# Patient Record
Sex: FEMALE | Race: BLACK OR AFRICAN AMERICAN | NOT HISPANIC OR LATINO | Employment: OTHER | URBAN - METROPOLITAN AREA
[De-identification: names, ages, dates, MRNs, and addresses within clinical notes are randomized per-mention and may not be internally consistent; named-entity substitution may affect disease eponyms.]

---

## 2017-11-13 ENCOUNTER — HISTORICAL (OUTPATIENT)
Dept: ADMINISTRATIVE | Facility: HOSPITAL | Age: 62
End: 2017-11-13

## 2017-11-13 LAB
ABS NEUT (OLG): 5.03 X10(3)/MCL (ref 2.1–9.2)
ALBUMIN SERPL-MCNC: 3.7 GM/DL (ref 3.4–5)
ALBUMIN/GLOB SERPL: 1.1 {RATIO}
ALP SERPL-CCNC: 76 UNIT/L (ref 38–126)
ALT SERPL-CCNC: 20 UNIT/L (ref 12–78)
AST SERPL-CCNC: 10 UNIT/L (ref 15–37)
BASOPHILS # BLD AUTO: 0 X10(3)/MCL (ref 0–0.2)
BASOPHILS NFR BLD AUTO: 1 %
BILIRUB SERPL-MCNC: 0.4 MG/DL (ref 0.2–1)
BILIRUBIN DIRECT+TOT PNL SERPL-MCNC: 0.1 MG/DL (ref 0–0.2)
BILIRUBIN DIRECT+TOT PNL SERPL-MCNC: 0.3 MG/DL (ref 0–0.8)
BUN SERPL-MCNC: 17 MG/DL (ref 7–18)
CALCIUM SERPL-MCNC: 9.1 MG/DL (ref 8.5–10.1)
CHLORIDE SERPL-SCNC: 100 MMOL/L (ref 98–107)
CHOLEST SERPL-MCNC: 257 MG/DL (ref 0–200)
CHOLEST/HDLC SERPL: 6.3 {RATIO} (ref 0–4)
CO2 SERPL-SCNC: 27 MMOL/L (ref 21–32)
CREAT SERPL-MCNC: 1.13 MG/DL (ref 0.55–1.02)
DEPRECATED CALCIDIOL+CALCIFEROL SERPL-MC: 23.65 NG/ML (ref 30–80)
EOSINOPHIL # BLD AUTO: 0.1 X10(3)/MCL (ref 0–0.9)
EOSINOPHIL NFR BLD AUTO: 1 %
ERYTHROCYTE [DISTWIDTH] IN BLOOD BY AUTOMATED COUNT: 12.7 % (ref 11.5–17)
EST. AVERAGE GLUCOSE BLD GHB EST-MCNC: 209 MG/DL
GLOBULIN SER-MCNC: 3.4 GM/DL (ref 2.4–3.5)
GLUCOSE SERPL-MCNC: 196 MG/DL (ref 74–106)
HBA1C MFR BLD: 8.9 % (ref 4.2–6.3)
HCT VFR BLD AUTO: 35.4 % (ref 37–47)
HDLC SERPL-MCNC: 41 MG/DL (ref 35–60)
HGB BLD-MCNC: 11.3 GM/DL (ref 12–16)
LDLC SERPL CALC-MCNC: 167 MG/DL (ref 0–129)
LYMPHOCYTES # BLD AUTO: 1.4 X10(3)/MCL (ref 0.6–4.6)
LYMPHOCYTES NFR BLD AUTO: 20 %
MCH RBC QN AUTO: 30.1 PG (ref 27–31)
MCHC RBC AUTO-ENTMCNC: 31.9 GM/DL (ref 33–36)
MCV RBC AUTO: 94.4 FL (ref 80–94)
MONOCYTES # BLD AUTO: 0.5 X10(3)/MCL (ref 0.1–1.3)
MONOCYTES NFR BLD AUTO: 7 %
NEUTROPHILS # BLD AUTO: 5.03 X10(3)/MCL (ref 1.4–7.9)
NEUTROPHILS NFR BLD AUTO: 70 %
PLATELET # BLD AUTO: 231 X10(3)/MCL (ref 130–400)
PMV BLD AUTO: 10.9 FL (ref 9.4–12.4)
POTASSIUM SERPL-SCNC: 3.8 MMOL/L (ref 3.5–5.1)
PROT SERPL-MCNC: 7.1 GM/DL (ref 6.4–8.2)
RBC # BLD AUTO: 3.75 X10(6)/MCL (ref 4.2–5.4)
SODIUM SERPL-SCNC: 136 MMOL/L (ref 136–145)
TRIGL SERPL-MCNC: 245 MG/DL (ref 30–150)
VLDLC SERPL CALC-MCNC: 49 MG/DL
WBC # SPEC AUTO: 7.2 X10(3)/MCL (ref 4.5–11.5)

## 2018-02-21 ENCOUNTER — HISTORICAL (OUTPATIENT)
Dept: RADIOLOGY | Facility: HOSPITAL | Age: 63
End: 2018-02-21

## 2018-02-26 ENCOUNTER — HISTORICAL (OUTPATIENT)
Dept: RADIOLOGY | Facility: HOSPITAL | Age: 63
End: 2018-02-26

## 2018-10-15 ENCOUNTER — HISTORICAL (OUTPATIENT)
Dept: RADIOLOGY | Facility: HOSPITAL | Age: 63
End: 2018-10-15

## 2018-11-08 ENCOUNTER — HISTORICAL (OUTPATIENT)
Dept: ADMINISTRATIVE | Facility: HOSPITAL | Age: 63
End: 2018-11-08

## 2018-11-08 LAB
ABS NEUT (OLG): 5.73 X10(3)/MCL (ref 2.1–9.2)
ALBUMIN SERPL-MCNC: 3.6 GM/DL (ref 3.4–5)
ALBUMIN/GLOB SERPL: 1 {RATIO}
ALP SERPL-CCNC: 70 UNIT/L (ref 38–126)
ALT SERPL-CCNC: 24 UNIT/L (ref 12–78)
APPEARANCE, UA: ABNORMAL
AST SERPL-CCNC: 9 UNIT/L (ref 15–37)
BACTERIA SPEC CULT: ABNORMAL /HPF
BASOPHILS # BLD AUTO: 0 X10(3)/MCL (ref 0–0.2)
BASOPHILS NFR BLD AUTO: 0 %
BILIRUB SERPL-MCNC: 0.7 MG/DL (ref 0.2–1)
BILIRUB UR QL STRIP: NEGATIVE
BILIRUBIN DIRECT+TOT PNL SERPL-MCNC: 0.1 MG/DL (ref 0–0.2)
BILIRUBIN DIRECT+TOT PNL SERPL-MCNC: 0.6 MG/DL (ref 0–0.8)
BUN SERPL-MCNC: 16 MG/DL (ref 7–18)
CALCIUM SERPL-MCNC: 9.7 MG/DL (ref 8.5–10.1)
CHLORIDE SERPL-SCNC: 98 MMOL/L (ref 98–107)
CHOLEST SERPL-MCNC: 253 MG/DL (ref 0–200)
CHOLEST/HDLC SERPL: 6.7 {RATIO} (ref 0–4)
CO2 SERPL-SCNC: 28 MMOL/L (ref 21–32)
COLOR UR: YELLOW
CREAT SERPL-MCNC: 1.21 MG/DL (ref 0.55–1.02)
CREAT UR-MCNC: 245 MG/DL
DEPRECATED CALCIDIOL+CALCIFEROL SERPL-MC: 27.85 NG/ML (ref 30–80)
EOSINOPHIL # BLD AUTO: 0.1 X10(3)/MCL (ref 0–0.9)
EOSINOPHIL NFR BLD AUTO: 1 %
ERYTHROCYTE [DISTWIDTH] IN BLOOD BY AUTOMATED COUNT: 12.4 % (ref 11.5–17)
EST. AVERAGE GLUCOSE BLD GHB EST-MCNC: 200 MG/DL
GLOBULIN SER-MCNC: 3.6 GM/DL (ref 2.4–3.5)
GLUCOSE (UA): NEGATIVE
GLUCOSE SERPL-MCNC: 207 MG/DL (ref 74–106)
HBA1C MFR BLD: 8.6 % (ref 4.2–6.3)
HCT VFR BLD AUTO: 36.8 % (ref 37–47)
HDLC SERPL-MCNC: 38 MG/DL (ref 35–60)
HGB BLD-MCNC: 11.9 GM/DL (ref 12–16)
HGB UR QL STRIP: NEGATIVE
KETONES UR QL STRIP: ABNORMAL
LDLC SERPL CALC-MCNC: 158 MG/DL (ref 0–129)
LEUKOCYTE ESTERASE UR QL STRIP: NEGATIVE
LYMPHOCYTES # BLD AUTO: 1.6 X10(3)/MCL (ref 0.6–4.6)
LYMPHOCYTES NFR BLD AUTO: 20 %
MCH RBC QN AUTO: 30.2 PG (ref 27–31)
MCHC RBC AUTO-ENTMCNC: 32.3 GM/DL (ref 33–36)
MCV RBC AUTO: 93.4 FL (ref 80–94)
MICROALBUMIN UR-MCNC: 2.9 MG/DL
MICROALBUMIN/CREAT RATIO PNL UR: 11.8 MG/GM CR (ref 0–30)
MONOCYTES # BLD AUTO: 0.5 X10(3)/MCL (ref 0.1–1.3)
MONOCYTES NFR BLD AUTO: 6 %
NEUTROPHILS # BLD AUTO: 5.73 X10(3)/MCL (ref 2.1–9.2)
NEUTROPHILS NFR BLD AUTO: 71 %
NITRITE UR QL STRIP: NEGATIVE
PH UR STRIP: 5 [PH] (ref 5–9)
PLATELET # BLD AUTO: 248 X10(3)/MCL (ref 130–400)
PMV BLD AUTO: 11 FL (ref 9.4–12.4)
POTASSIUM SERPL-SCNC: 3.9 MMOL/L (ref 3.5–5.1)
PROT SERPL-MCNC: 7.2 GM/DL (ref 6.4–8.2)
PROT UR QL STRIP: NEGATIVE
RBC # BLD AUTO: 3.94 X10(6)/MCL (ref 4.2–5.4)
RBC #/AREA URNS HPF: ABNORMAL /[HPF]
SODIUM SERPL-SCNC: 137 MMOL/L (ref 136–145)
SP GR UR STRIP: 1.02 (ref 1–1.03)
SQUAMOUS EPITHELIAL, UA: ABNORMAL
TRIGL SERPL-MCNC: 287 MG/DL (ref 30–150)
TSH SERPL-ACNC: 2.72 MIU/L (ref 0.36–3.74)
UROBILINOGEN UR STRIP-ACNC: 0.2
VLDLC SERPL CALC-MCNC: 57 MG/DL
WBC # SPEC AUTO: 8 X10(3)/MCL (ref 4.5–11.5)
WBC #/AREA URNS HPF: ABNORMAL /[HPF]

## 2019-01-15 ENCOUNTER — HISTORICAL (OUTPATIENT)
Dept: ADMINISTRATIVE | Facility: HOSPITAL | Age: 64
End: 2019-01-15

## 2019-01-22 ENCOUNTER — HISTORICAL (OUTPATIENT)
Dept: RADIOLOGY | Facility: HOSPITAL | Age: 64
End: 2019-01-22

## 2019-02-08 ENCOUNTER — HISTORICAL (OUTPATIENT)
Dept: ADMINISTRATIVE | Facility: HOSPITAL | Age: 64
End: 2019-02-08

## 2019-02-08 LAB
ALBUMIN SERPL-MCNC: 3.7 GM/DL (ref 3.4–5)
ALBUMIN/GLOB SERPL: 1.1 {RATIO}
ALP SERPL-CCNC: 70 UNIT/L (ref 38–126)
ALT SERPL-CCNC: 18 UNIT/L (ref 12–78)
AST SERPL-CCNC: 8 UNIT/L (ref 15–37)
BILIRUB SERPL-MCNC: 0.6 MG/DL (ref 0.2–1)
BILIRUBIN DIRECT+TOT PNL SERPL-MCNC: 0.1 MG/DL (ref 0–0.2)
BILIRUBIN DIRECT+TOT PNL SERPL-MCNC: 0.5 MG/DL (ref 0–0.8)
BUN SERPL-MCNC: 21 MG/DL (ref 7–18)
CALCIUM SERPL-MCNC: 9.4 MG/DL (ref 8.5–10.1)
CHLORIDE SERPL-SCNC: 100 MMOL/L (ref 98–107)
CHOLEST SERPL-MCNC: 219 MG/DL (ref 0–200)
CHOLEST/HDLC SERPL: 5.8 {RATIO} (ref 0–4)
CO2 SERPL-SCNC: 27 MMOL/L (ref 21–32)
CREAT SERPL-MCNC: 1.38 MG/DL (ref 0.55–1.02)
DEPRECATED CALCIDIOL+CALCIFEROL SERPL-MC: 48.64 NG/ML (ref 30–80)
EST. AVERAGE GLUCOSE BLD GHB EST-MCNC: 163 MG/DL
GLOBULIN SER-MCNC: 3.4 GM/DL (ref 2.4–3.5)
GLUCOSE SERPL-MCNC: 167 MG/DL (ref 74–106)
HBA1C MFR BLD: 7.3 % (ref 4.2–6.3)
HDLC SERPL-MCNC: 38 MG/DL (ref 35–60)
LDLC SERPL CALC-MCNC: 135 MG/DL (ref 0–129)
POTASSIUM SERPL-SCNC: 4 MMOL/L (ref 3.5–5.1)
PROT SERPL-MCNC: 7.1 GM/DL (ref 6.4–8.2)
SODIUM SERPL-SCNC: 137 MMOL/L (ref 136–145)
TRIGL SERPL-MCNC: 228 MG/DL (ref 30–150)
VLDLC SERPL CALC-MCNC: 46 MG/DL

## 2019-06-14 ENCOUNTER — HISTORICAL (OUTPATIENT)
Dept: ADMINISTRATIVE | Facility: HOSPITAL | Age: 64
End: 2019-06-14

## 2019-06-14 LAB
ALBUMIN SERPL-MCNC: 3.6 GM/DL (ref 3.4–5)
ALBUMIN/GLOB SERPL: 0.9 {RATIO}
ALP SERPL-CCNC: 76 UNIT/L (ref 38–126)
ALT SERPL-CCNC: 17 UNIT/L (ref 12–78)
AST SERPL-CCNC: 7 UNIT/L (ref 15–37)
BILIRUB SERPL-MCNC: 0.5 MG/DL (ref 0.2–1)
BILIRUBIN DIRECT+TOT PNL SERPL-MCNC: 0.1 MG/DL (ref 0–0.2)
BILIRUBIN DIRECT+TOT PNL SERPL-MCNC: 0.4 MG/DL (ref 0–0.8)
BUN SERPL-MCNC: 23 MG/DL (ref 7–18)
CALCIUM SERPL-MCNC: 10 MG/DL (ref 8.5–10.1)
CHLORIDE SERPL-SCNC: 100 MMOL/L (ref 98–107)
CHOLEST SERPL-MCNC: 232 MG/DL (ref 0–200)
CHOLEST/HDLC SERPL: 4.7 {RATIO} (ref 0–4)
CO2 SERPL-SCNC: 28 MMOL/L (ref 21–32)
CREAT SERPL-MCNC: 1.4 MG/DL (ref 0.55–1.02)
EST. AVERAGE GLUCOSE BLD GHB EST-MCNC: 143 MG/DL
GLOBULIN SER-MCNC: 3.9 GM/DL (ref 2.4–3.5)
GLUCOSE SERPL-MCNC: 151 MG/DL (ref 74–106)
HBA1C MFR BLD: 6.6 % (ref 4.2–6.3)
HDLC SERPL-MCNC: 49 MG/DL (ref 35–60)
LDLC SERPL CALC-MCNC: 148 MG/DL (ref 0–129)
POTASSIUM SERPL-SCNC: 3.9 MMOL/L (ref 3.5–5.1)
PROT SERPL-MCNC: 7.5 GM/DL (ref 6.4–8.2)
SODIUM SERPL-SCNC: 136 MMOL/L (ref 136–145)
TRIGL SERPL-MCNC: 174 MG/DL (ref 30–150)
VLDLC SERPL CALC-MCNC: 35 MG/DL

## 2019-07-03 ENCOUNTER — HISTORICAL (OUTPATIENT)
Dept: ADMINISTRATIVE | Facility: HOSPITAL | Age: 64
End: 2019-07-03

## 2019-07-03 LAB
ALBUMIN SERPL-MCNC: 3.5 GM/DL (ref 3.4–5)
ALBUMIN/GLOB SERPL: 1 {RATIO}
ALP SERPL-CCNC: 73 UNIT/L (ref 38–126)
ALT SERPL-CCNC: 18 UNIT/L (ref 12–78)
AST SERPL-CCNC: 8 UNIT/L (ref 15–37)
BILIRUB SERPL-MCNC: 0.6 MG/DL (ref 0.2–1)
BILIRUBIN DIRECT+TOT PNL SERPL-MCNC: 0.1 MG/DL (ref 0–0.2)
BILIRUBIN DIRECT+TOT PNL SERPL-MCNC: 0.5 MG/DL (ref 0–0.8)
BUN SERPL-MCNC: 17 MG/DL (ref 7–18)
CALCIUM SERPL-MCNC: 9.6 MG/DL (ref 8.5–10.1)
CHLORIDE SERPL-SCNC: 103 MMOL/L (ref 98–107)
CO2 SERPL-SCNC: 27 MMOL/L (ref 21–32)
CREAT SERPL-MCNC: 1.35 MG/DL (ref 0.55–1.02)
GLOBULIN SER-MCNC: 3.6 GM/DL (ref 2.4–3.5)
GLUCOSE SERPL-MCNC: 133 MG/DL (ref 74–106)
POTASSIUM SERPL-SCNC: 3.9 MMOL/L (ref 3.5–5.1)
PROT SERPL-MCNC: 7.1 GM/DL (ref 6.4–8.2)
SODIUM SERPL-SCNC: 138 MMOL/L (ref 136–145)

## 2019-07-10 ENCOUNTER — HISTORICAL (OUTPATIENT)
Dept: RADIOLOGY | Facility: HOSPITAL | Age: 64
End: 2019-07-10

## 2019-11-01 ENCOUNTER — HISTORICAL (OUTPATIENT)
Dept: ADMINISTRATIVE | Facility: HOSPITAL | Age: 64
End: 2019-11-01

## 2019-11-01 LAB
ABS NEUT (OLG): 4.67 X10(3)/MCL (ref 2.1–9.2)
ALBUMIN SERPL-MCNC: 3.7 GM/DL (ref 3.4–5)
ALBUMIN/GLOB SERPL: 1 RATIO (ref 1.1–2)
ALP SERPL-CCNC: 102 UNIT/L (ref 38–126)
ALT SERPL-CCNC: 20 UNIT/L (ref 12–78)
APPEARANCE, UA: ABNORMAL
AST SERPL-CCNC: 11 UNIT/L (ref 15–37)
BACTERIA SPEC CULT: ABNORMAL /HPF
BASOPHILS # BLD AUTO: 0.1 X10(3)/MCL (ref 0–0.2)
BASOPHILS NFR BLD AUTO: 1 %
BILIRUB SERPL-MCNC: 0.6 MG/DL (ref 0.2–1)
BILIRUB UR QL STRIP: NEGATIVE
BILIRUBIN DIRECT+TOT PNL SERPL-MCNC: 0.1 MG/DL (ref 0–0.5)
BILIRUBIN DIRECT+TOT PNL SERPL-MCNC: 0.5 MG/DL (ref 0–0.8)
BUN SERPL-MCNC: 34 MG/DL (ref 7–18)
CALCIUM SERPL-MCNC: 9.7 MG/DL (ref 8.5–10.1)
CHLORIDE SERPL-SCNC: 100 MMOL/L (ref 98–107)
CHOLEST SERPL-MCNC: 262 MG/DL (ref 0–200)
CHOLEST/HDLC SERPL: 6.2 {RATIO} (ref 0–4)
CO2 SERPL-SCNC: 29 MMOL/L (ref 21–32)
COLOR UR: YELLOW
CREAT SERPL-MCNC: 1.83 MG/DL (ref 0.55–1.02)
CREAT UR-MCNC: 245 MG/DL
EOSINOPHIL # BLD AUTO: 0.1 X10(3)/MCL (ref 0–0.9)
EOSINOPHIL NFR BLD AUTO: 2 %
ERYTHROCYTE [DISTWIDTH] IN BLOOD BY AUTOMATED COUNT: 12.3 % (ref 11.5–17)
EST. AVERAGE GLUCOSE BLD GHB EST-MCNC: 166 MG/DL
GLOBULIN SER-MCNC: 3.8 GM/DL (ref 2.4–3.5)
GLUCOSE (UA): NEGATIVE
GLUCOSE SERPL-MCNC: 214 MG/DL (ref 74–106)
HBA1C MFR BLD: 7.4 % (ref 4.2–6.3)
HCT VFR BLD AUTO: 35.9 % (ref 37–47)
HDLC SERPL-MCNC: 42 MG/DL (ref 35–60)
HGB BLD-MCNC: 11.5 GM/DL (ref 12–16)
HGB UR QL STRIP: NEGATIVE
KETONES UR QL STRIP: NEGATIVE
LDLC SERPL CALC-MCNC: 171 MG/DL (ref 0–129)
LEUKOCYTE ESTERASE UR QL STRIP: NEGATIVE
LYMPHOCYTES # BLD AUTO: 2 X10(3)/MCL (ref 0.6–4.6)
LYMPHOCYTES NFR BLD AUTO: 27 %
MCH RBC QN AUTO: 29.4 PG (ref 27–31)
MCHC RBC AUTO-ENTMCNC: 32 GM/DL (ref 33–36)
MCV RBC AUTO: 91.8 FL (ref 80–94)
MICROALBUMIN UR-MCNC: 1.5 MG/DL
MICROALBUMIN/CREAT RATIO PNL UR: 6.1 MG/GM CR (ref 0–30)
MONOCYTES # BLD AUTO: 0.4 X10(3)/MCL (ref 0.1–1.3)
MONOCYTES NFR BLD AUTO: 6 %
NEUTROPHILS # BLD AUTO: 4.67 X10(3)/MCL (ref 2.1–9.2)
NEUTROPHILS NFR BLD AUTO: 64 %
NITRITE UR QL STRIP: NEGATIVE
PH UR STRIP: 5 [PH] (ref 5–9)
PLATELET # BLD AUTO: 265 X10(3)/MCL (ref 130–400)
PMV BLD AUTO: 10.5 FL (ref 9.4–12.4)
POTASSIUM SERPL-SCNC: 4.1 MMOL/L (ref 3.5–5.1)
PROT SERPL-MCNC: 7.5 GM/DL (ref 6.4–8.2)
PROT UR QL STRIP: NEGATIVE
RBC # BLD AUTO: 3.91 X10(6)/MCL (ref 4.2–5.4)
RBC #/AREA URNS HPF: ABNORMAL /[HPF]
SODIUM SERPL-SCNC: 137 MMOL/L (ref 136–145)
SP GR UR STRIP: 1.02 (ref 1–1.03)
SQUAMOUS EPITHELIAL, UA: 16 /HPF (ref 0–4)
TRIGL SERPL-MCNC: 247 MG/DL (ref 30–150)
UROBILINOGEN UR STRIP-ACNC: 0.2
VLDLC SERPL CALC-MCNC: 49 MG/DL
WBC # SPEC AUTO: 7.3 X10(3)/MCL (ref 4.5–11.5)
WBC #/AREA URNS HPF: 5 /HPF (ref 0–3)

## 2019-12-03 ENCOUNTER — HISTORICAL (OUTPATIENT)
Dept: ADMINISTRATIVE | Facility: HOSPITAL | Age: 64
End: 2019-12-03

## 2019-12-03 LAB
ALBUMIN SERPL-MCNC: 3.4 GM/DL (ref 3.4–5)
ALBUMIN/GLOB SERPL: 0.9 RATIO (ref 1.1–2)
ALP SERPL-CCNC: 82 UNIT/L (ref 38–126)
ALT SERPL-CCNC: 21 UNIT/L (ref 12–78)
AST SERPL-CCNC: 9 UNIT/L (ref 15–37)
BILIRUB SERPL-MCNC: 0.5 MG/DL (ref 0.2–1)
BILIRUBIN DIRECT+TOT PNL SERPL-MCNC: 0.1 MG/DL (ref 0–0.5)
BILIRUBIN DIRECT+TOT PNL SERPL-MCNC: 0.4 MG/DL (ref 0–0.8)
BUN SERPL-MCNC: 17 MG/DL (ref 7–18)
CALCIUM SERPL-MCNC: 9.5 MG/DL (ref 8.5–10.1)
CHLORIDE SERPL-SCNC: 105 MMOL/L (ref 98–107)
CO2 SERPL-SCNC: 27 MMOL/L (ref 21–32)
CREAT SERPL-MCNC: 1.28 MG/DL (ref 0.55–1.02)
GLOBULIN SER-MCNC: 3.8 GM/DL (ref 2.4–3.5)
GLUCOSE SERPL-MCNC: 142 MG/DL (ref 74–106)
POTASSIUM SERPL-SCNC: 4 MMOL/L (ref 3.5–5.1)
PROT SERPL-MCNC: 7.2 GM/DL (ref 6.4–8.2)
SODIUM SERPL-SCNC: 139 MMOL/L (ref 136–145)

## 2020-07-29 ENCOUNTER — HISTORICAL (OUTPATIENT)
Dept: RADIOLOGY | Facility: HOSPITAL | Age: 65
End: 2020-07-29

## 2020-08-04 ENCOUNTER — HISTORICAL (OUTPATIENT)
Dept: ADMINISTRATIVE | Facility: HOSPITAL | Age: 65
End: 2020-08-04

## 2020-08-04 LAB
ABS NEUT (OLG): 4.77 X10(3)/MCL (ref 2.1–9.2)
ALBUMIN SERPL-MCNC: 3.9 GM/DL (ref 3.4–5)
ALBUMIN/GLOB SERPL: 1.1 RATIO (ref 1.1–2)
ALP SERPL-CCNC: 88 UNIT/L (ref 40–150)
ALT SERPL-CCNC: 13 UNIT/L (ref 0–55)
APPEARANCE, UA: CLEAR
AST SERPL-CCNC: 9 UNIT/L (ref 5–34)
BACTERIA SPEC CULT: ABNORMAL /HPF
BASOPHILS # BLD AUTO: 0 X10(3)/MCL (ref 0–0.2)
BASOPHILS NFR BLD AUTO: 1 %
BILIRUB SERPL-MCNC: 0.5 MG/DL
BILIRUB UR QL STRIP: NEGATIVE
BILIRUBIN DIRECT+TOT PNL SERPL-MCNC: 0.2 MG/DL (ref 0–0.5)
BILIRUBIN DIRECT+TOT PNL SERPL-MCNC: 0.3 MG/DL (ref 0–0.8)
BUN SERPL-MCNC: 27.6 MG/DL (ref 9.8–20.1)
CALCIUM SERPL-MCNC: 9.5 MG/DL (ref 8.4–10.2)
CHLORIDE SERPL-SCNC: 106 MMOL/L (ref 98–107)
CHOLEST SERPL-MCNC: 256 MG/DL
CHOLEST/HDLC SERPL: 7 {RATIO} (ref 0–5)
CO2 SERPL-SCNC: 28 MMOL/L (ref 23–31)
COLOR UR: YELLOW
CREAT SERPL-MCNC: 1.43 MG/DL (ref 0.55–1.02)
CREAT UR-MCNC: 142.8 MG/DL (ref 45–106)
EOSINOPHIL # BLD AUTO: 0.1 X10(3)/MCL (ref 0–0.9)
EOSINOPHIL NFR BLD AUTO: 2 %
ERYTHROCYTE [DISTWIDTH] IN BLOOD BY AUTOMATED COUNT: 13.1 % (ref 11.5–17)
EST. AVERAGE GLUCOSE BLD GHB EST-MCNC: 128.4 MG/DL
GLOBULIN SER-MCNC: 3.5 GM/DL (ref 2.4–3.5)
GLUCOSE (UA): NEGATIVE
GLUCOSE SERPL-MCNC: 169 MG/DL (ref 82–115)
HBA1C MFR BLD: 6.1 %
HCT VFR BLD AUTO: 35.6 % (ref 37–47)
HDLC SERPL-MCNC: 39 MG/DL (ref 35–60)
HGB BLD-MCNC: 11.4 GM/DL (ref 12–16)
HGB UR QL STRIP: NEGATIVE
KETONES UR QL STRIP: NEGATIVE
LDLC SERPL CALC-MCNC: 182 MG/DL (ref 50–140)
LEUKOCYTE ESTERASE UR QL STRIP: ABNORMAL
LYMPHOCYTES # BLD AUTO: 1.3 X10(3)/MCL (ref 0.6–4.6)
LYMPHOCYTES NFR BLD AUTO: 19 %
MCH RBC QN AUTO: 30.5 PG (ref 27–31)
MCHC RBC AUTO-ENTMCNC: 32 GM/DL (ref 33–36)
MCV RBC AUTO: 95.2 FL (ref 80–94)
MICROALBUMIN UR-MCNC: 13.4 UG/ML
MICROALBUMIN/CREAT RATIO PNL UR: 9.4 MG/GM CR (ref 0–30)
MONOCYTES # BLD AUTO: 0.4 X10(3)/MCL (ref 0.1–1.3)
MONOCYTES NFR BLD AUTO: 6 %
NEUTROPHILS # BLD AUTO: 4.77 X10(3)/MCL (ref 2.1–9.2)
NEUTROPHILS NFR BLD AUTO: 72 %
NITRITE UR QL STRIP: NEGATIVE
PH UR STRIP: 5 [PH] (ref 5–9)
PLATELET # BLD AUTO: 237 X10(3)/MCL (ref 130–400)
PMV BLD AUTO: 10.6 FL (ref 9.4–12.4)
POTASSIUM SERPL-SCNC: 4.4 MMOL/L (ref 3.5–5.1)
PROT SERPL-MCNC: 7.4 GM/DL (ref 5.8–7.6)
PROT UR QL STRIP: NEGATIVE
RBC # BLD AUTO: 3.74 X10(6)/MCL (ref 4.2–5.4)
RBC #/AREA URNS HPF: ABNORMAL /[HPF]
SODIUM SERPL-SCNC: 139 MMOL/L (ref 136–145)
SP GR UR STRIP: 1.02 (ref 1–1.03)
SQUAMOUS EPITHELIAL, UA: ABNORMAL
TRIGL SERPL-MCNC: 173 MG/DL (ref 37–140)
TSH SERPL-ACNC: 1.83 UIU/ML (ref 0.35–4.94)
UROBILINOGEN UR STRIP-ACNC: 0.2
VLDLC SERPL CALC-MCNC: 35 MG/DL
WBC # SPEC AUTO: 6.6 X10(3)/MCL (ref 4.5–11.5)
WBC #/AREA URNS HPF: 8 /HPF (ref 0–3)

## 2021-02-05 ENCOUNTER — HISTORICAL (OUTPATIENT)
Dept: ADMINISTRATIVE | Facility: HOSPITAL | Age: 66
End: 2021-02-05

## 2021-02-05 LAB
APPEARANCE, UA: CLEAR
BACTERIA SPEC CULT: ABNORMAL /HPF
BILIRUB UR QL STRIP: NEGATIVE
COLOR UR: YELLOW
GLUCOSE (UA): NEGATIVE
HGB UR QL STRIP: NEGATIVE
KETONES UR QL STRIP: NEGATIVE
LEUKOCYTE ESTERASE UR QL STRIP: NEGATIVE
NITRITE UR QL STRIP: NEGATIVE
PH UR STRIP: 5 [PH] (ref 5–9)
PROT UR QL STRIP: ABNORMAL
RBC #/AREA URNS HPF: ABNORMAL /[HPF]
SP GR UR STRIP: 1.02 (ref 1–1.03)
SQUAMOUS EPITHELIAL, UA: ABNORMAL
UROBILINOGEN UR STRIP-ACNC: 0.2
WBC #/AREA URNS HPF: ABNORMAL /[HPF]

## 2021-07-08 ENCOUNTER — HISTORICAL (OUTPATIENT)
Dept: ADMINISTRATIVE | Facility: HOSPITAL | Age: 66
End: 2021-07-08

## 2021-07-08 LAB
ABS NEUT (OLG): 5.87 X10(3)/MCL (ref 2.1–9.2)
ALBUMIN SERPL-MCNC: 3.8 GM/DL (ref 3.4–4.8)
ALBUMIN/GLOB SERPL: 1 RATIO (ref 1.1–2)
ALP SERPL-CCNC: 79 UNIT/L (ref 40–150)
ALT SERPL-CCNC: 16 UNIT/L (ref 0–55)
APPEARANCE, UA: CLEAR
AST SERPL-CCNC: 13 UNIT/L (ref 5–34)
BACTERIA SPEC CULT: ABNORMAL /HPF
BASOPHILS # BLD AUTO: 0 X10(3)/MCL (ref 0–0.2)
BASOPHILS NFR BLD AUTO: 0 %
BILIRUB SERPL-MCNC: 0.6 MG/DL
BILIRUB UR QL STRIP: NEGATIVE
BILIRUBIN DIRECT+TOT PNL SERPL-MCNC: 0.2 MG/DL (ref 0–0.5)
BILIRUBIN DIRECT+TOT PNL SERPL-MCNC: 0.4 MG/DL (ref 0–0.8)
BUN SERPL-MCNC: 31.3 MG/DL (ref 9.8–20.1)
CALCIUM SERPL-MCNC: 10.3 MG/DL (ref 8.4–10.2)
CHLORIDE SERPL-SCNC: 101 MMOL/L (ref 98–107)
CHOLEST SERPL-MCNC: 260 MG/DL
CHOLEST/HDLC SERPL: 6 {RATIO} (ref 0–5)
CO2 SERPL-SCNC: 26 MMOL/L (ref 23–31)
COLOR UR: YELLOW
CREAT SERPL-MCNC: 1.63 MG/DL (ref 0.55–1.02)
CREAT UR-MCNC: 189.3 MG/DL (ref 45–106)
EOSINOPHIL # BLD AUTO: 0.1 X10(3)/MCL (ref 0–0.9)
EOSINOPHIL NFR BLD AUTO: 1 %
ERYTHROCYTE [DISTWIDTH] IN BLOOD BY AUTOMATED COUNT: 13.2 % (ref 11.5–17)
EST. AVERAGE GLUCOSE BLD GHB EST-MCNC: 157.1 MG/DL
GLOBULIN SER-MCNC: 3.9 GM/DL (ref 2.4–3.5)
GLUCOSE (UA): NEGATIVE
GLUCOSE SERPL-MCNC: 180 MG/DL (ref 82–115)
HBA1C MFR BLD: 7.1 %
HCT VFR BLD AUTO: 34.5 % (ref 37–47)
HDLC SERPL-MCNC: 43 MG/DL (ref 35–60)
HGB BLD-MCNC: 11.4 GM/DL (ref 12–16)
HGB UR QL STRIP: NEGATIVE
KETONES UR QL STRIP: ABNORMAL
LDLC SERPL CALC-MCNC: 189 MG/DL (ref 50–140)
LEUKOCYTE ESTERASE UR QL STRIP: NEGATIVE
LYMPHOCYTES # BLD AUTO: 1.2 X10(3)/MCL (ref 0.6–4.6)
LYMPHOCYTES NFR BLD AUTO: 15 %
MCH RBC QN AUTO: 30.6 PG (ref 27–31)
MCHC RBC AUTO-ENTMCNC: 33 GM/DL (ref 33–36)
MCV RBC AUTO: 92.7 FL (ref 80–94)
MICROALBUMIN UR-MCNC: 16.1 UG/ML
MICROALBUMIN/CREAT RATIO PNL UR: 8.5 MG/GM CR (ref 0–30)
MONOCYTES # BLD AUTO: 0.4 X10(3)/MCL (ref 0.1–1.3)
MONOCYTES NFR BLD AUTO: 6 %
NEUTROPHILS # BLD AUTO: 5.87 X10(3)/MCL (ref 2.1–9.2)
NEUTROPHILS NFR BLD AUTO: 78 %
NITRITE UR QL STRIP: NEGATIVE
PH UR STRIP: 5.5 [PH] (ref 5–9)
PLATELET # BLD AUTO: 251 X10(3)/MCL (ref 130–400)
PMV BLD AUTO: 11.3 FL (ref 9.4–12.4)
POTASSIUM SERPL-SCNC: 4.4 MMOL/L (ref 3.5–5.1)
PROT SERPL-MCNC: 7.7 GM/DL (ref 5.8–7.6)
PROT UR QL STRIP: NEGATIVE
RBC # BLD AUTO: 3.72 X10(6)/MCL (ref 4.2–5.4)
RBC #/AREA URNS HPF: ABNORMAL /[HPF]
SODIUM SERPL-SCNC: 136 MMOL/L (ref 136–145)
SP GR UR STRIP: 1.02 (ref 1–1.03)
SQUAMOUS EPITHELIAL, UA: ABNORMAL /HPF
TRIGL SERPL-MCNC: 139 MG/DL (ref 37–140)
TSH SERPL-ACNC: 1.82 UIU/ML (ref 0.35–4.94)
UA WBC MAN: ABNORMAL
UROBILINOGEN UR STRIP-ACNC: 0.2
VLDLC SERPL CALC-MCNC: 28 MG/DL
WBC # SPEC AUTO: 7.6 X10(3)/MCL (ref 4.5–11.5)

## 2021-07-29 ENCOUNTER — HISTORICAL (OUTPATIENT)
Dept: ADMINISTRATIVE | Facility: HOSPITAL | Age: 66
End: 2021-07-29

## 2021-07-29 LAB
ALBUMIN SERPL-MCNC: 3.5 GM/DL (ref 3.4–4.8)
ALBUMIN/GLOB SERPL: 0.9 RATIO (ref 1.1–2)
ALP SERPL-CCNC: 73 UNIT/L (ref 40–150)
ALT SERPL-CCNC: 15 UNIT/L (ref 0–55)
AST SERPL-CCNC: 11 UNIT/L (ref 5–34)
BILIRUB SERPL-MCNC: 0.5 MG/DL
BILIRUBIN DIRECT+TOT PNL SERPL-MCNC: 0.2 MG/DL (ref 0–0.5)
BILIRUBIN DIRECT+TOT PNL SERPL-MCNC: 0.3 MG/DL (ref 0–0.8)
BUN SERPL-MCNC: 24.9 MG/DL (ref 9.8–20.1)
CALCIUM SERPL-MCNC: 10 MG/DL (ref 8.4–10.2)
CHLORIDE SERPL-SCNC: 98 MMOL/L (ref 98–107)
CO2 SERPL-SCNC: 27 MMOL/L (ref 23–31)
CREAT SERPL-MCNC: 1.51 MG/DL (ref 0.55–1.02)
GLOBULIN SER-MCNC: 3.8 GM/DL (ref 2.4–3.5)
GLUCOSE SERPL-MCNC: 247 MG/DL (ref 82–115)
POTASSIUM SERPL-SCNC: 4.2 MMOL/L (ref 3.5–5.1)
PROT SERPL-MCNC: 7.3 GM/DL (ref 5.8–7.6)
SODIUM SERPL-SCNC: 135 MMOL/L (ref 136–145)

## 2021-08-02 ENCOUNTER — HISTORICAL (OUTPATIENT)
Dept: RADIOLOGY | Facility: HOSPITAL | Age: 66
End: 2021-08-02

## 2021-10-01 ENCOUNTER — HISTORICAL (OUTPATIENT)
Dept: ADMINISTRATIVE | Facility: HOSPITAL | Age: 66
End: 2021-10-01

## 2021-10-01 LAB
CHOLEST SERPL-MCNC: 241 MG/DL
CHOLEST/HDLC SERPL: 6 {RATIO} (ref 0–5)
EST. AVERAGE GLUCOSE BLD GHB EST-MCNC: 157.1 MG/DL
HBA1C MFR BLD: 7.1 %
HDLC SERPL-MCNC: 40 MG/DL (ref 35–60)
LDLC SERPL CALC-MCNC: 170 MG/DL (ref 50–140)
TRIGL SERPL-MCNC: 153 MG/DL (ref 37–140)
VLDLC SERPL CALC-MCNC: 31 MG/DL

## 2021-10-08 ENCOUNTER — HISTORICAL (OUTPATIENT)
Dept: ADMINISTRATIVE | Facility: HOSPITAL | Age: 66
End: 2021-10-08

## 2021-10-08 LAB
ABS NEUT (OLG): 4.7 X10(3)/MCL (ref 2.1–9.2)
ALBUMIN SERPL-MCNC: 3.8 GM/DL (ref 3.4–4.8)
ALBUMIN/GLOB SERPL: 1.1 RATIO (ref 1.1–2)
ALP SERPL-CCNC: 67 UNIT/L (ref 40–150)
ALT SERPL-CCNC: 14 UNIT/L (ref 0–55)
ANTINUCLEAR ANTIBODY SCREEN (OHS): NEGATIVE
AST SERPL-CCNC: 13 UNIT/L (ref 5–34)
BASOPHILS # BLD AUTO: 0 X10(3)/MCL (ref 0–0.2)
BASOPHILS NFR BLD AUTO: 0 %
BILIRUB SERPL-MCNC: 0.6 MG/DL
BILIRUBIN DIRECT+TOT PNL SERPL-MCNC: 0.2 MG/DL (ref 0–0.5)
BILIRUBIN DIRECT+TOT PNL SERPL-MCNC: 0.4 MG/DL (ref 0–0.8)
BUN SERPL-MCNC: 22.9 MG/DL (ref 9.8–20.1)
C3 SERPL-MCNC: 124 MG/DL (ref 80–173)
C4 SERPL-MCNC: 29.8 MG/DL (ref 13–46)
CALCIUM SERPL-MCNC: 9.7 MG/DL (ref 8.4–10.2)
CHLORIDE SERPL-SCNC: 105 MMOL/L (ref 98–107)
CO2 SERPL-SCNC: 25 MMOL/L (ref 23–31)
CREAT SERPL-MCNC: 1.5 MG/DL (ref 0.55–1.02)
DEPRECATED CALCIDIOL+CALCIFEROL SERPL-MC: 52 NG/ML (ref 30–80)
DSDNA ANTIBODY (OHS): POSITIVE
EOSINOPHIL # BLD AUTO: 0.1 X10(3)/MCL (ref 0–0.9)
EOSINOPHIL NFR BLD AUTO: 1 %
ERYTHROCYTE [DISTWIDTH] IN BLOOD BY AUTOMATED COUNT: 13.1 % (ref 11.5–17)
GLOBULIN SER-MCNC: 3.4 GM/DL (ref 2.4–3.5)
GLUCOSE SERPL-MCNC: 224 MG/DL (ref 82–115)
HCT VFR BLD AUTO: 34 % (ref 37–47)
HGB BLD-MCNC: 10.7 GM/DL (ref 12–16)
LYMPHOCYTES # BLD AUTO: 1 X10(3)/MCL (ref 0.6–4.6)
LYMPHOCYTES NFR BLD AUTO: 17 %
MAGNESIUM SERPL-MCNC: 1.9 MG/DL (ref 1.6–2.6)
MCH RBC QN AUTO: 29.6 PG (ref 27–31)
MCHC RBC AUTO-ENTMCNC: 31.5 GM/DL (ref 33–36)
MCV RBC AUTO: 93.9 FL (ref 80–94)
MONOCYTES # BLD AUTO: 0.3 X10(3)/MCL (ref 0.1–1.3)
MONOCYTES NFR BLD AUTO: 5 %
NEUTROPHILS # BLD AUTO: 4.7 X10(3)/MCL (ref 2.1–9.2)
NEUTROPHILS NFR BLD AUTO: 76 %
PHOSPHATE SERPL-MCNC: 3.2 MG/DL (ref 2.3–4.7)
PLATELET # BLD AUTO: 250 X10(3)/MCL (ref 130–400)
PMV BLD AUTO: 11.4 FL (ref 9.4–12.4)
POTASSIUM SERPL-SCNC: 4.2 MMOL/L (ref 3.5–5.1)
PROT 24H UR-MCNC: <136 MG/24 H (ref 0–165)
PROT SERPL-MCNC: 7.2 GM/DL (ref 5.8–7.6)
PTH-INTACT SERPL-MCNC: 60.4 PG/ML (ref 8.7–77.1)
RBC # BLD AUTO: 3.62 X10(6)/MCL (ref 4.2–5.4)
SODIUM SERPL-SCNC: 138 MMOL/L (ref 136–145)
WBC # SPEC AUTO: 6.2 X10(3)/MCL (ref 4.5–11.5)

## 2021-10-11 ENCOUNTER — HISTORICAL (OUTPATIENT)
Dept: RADIOLOGY | Facility: HOSPITAL | Age: 66
End: 2021-10-11

## 2021-11-09 LAB
LEFT EYE DM RETINOPATHY: NEGATIVE
RIGHT EYE DM RETINOPATHY: NEGATIVE

## 2022-03-08 ENCOUNTER — HISTORICAL (OUTPATIENT)
Dept: ADMINISTRATIVE | Facility: HOSPITAL | Age: 67
End: 2022-03-08

## 2022-03-08 LAB
ALBUMIN SERPL-MCNC: 3.8 G/DL (ref 3.4–4.8)
ALBUMIN/GLOB SERPL: 0.9 {RATIO} (ref 1.1–2)
ALP SERPL-CCNC: 62 U/L (ref 40–150)
ALT SERPL-CCNC: 19 U/L (ref 0–55)
AST SERPL-CCNC: 14 U/L (ref 5–34)
BILIRUB SERPL-MCNC: 0.4 MG/DL
BILIRUBIN DIRECT+TOT PNL SERPL-MCNC: 0.2 (ref 0–0.5)
BILIRUBIN DIRECT+TOT PNL SERPL-MCNC: 0.2 (ref 0–0.8)
BUN SERPL-MCNC: 23.5 MG/DL (ref 9.8–20.1)
CALCIUM SERPL-MCNC: 9.8 MG/DL (ref 8.7–10.5)
CHLORIDE SERPL-SCNC: 101 MMOL/L (ref 98–107)
CHOLEST SERPL-MCNC: 248 MG/DL
CHOLEST/HDLC SERPL: 6 {RATIO} (ref 0–5)
CO2 SERPL-SCNC: 26 MMOL/L (ref 23–31)
CREAT SERPL-MCNC: 1.32 MG/DL (ref 0.55–1.02)
EST. AVERAGE GLUCOSE BLD GHB EST-MCNC: 191.5 MG/DL
GLOBULIN SER-MCNC: 4.2 G/DL (ref 2.4–3.5)
GLUCOSE SERPL-MCNC: 188 MG/DL (ref 82–115)
HBA1C MFR BLD: 8.3 %
HDLC SERPL-MCNC: 42 MG/DL (ref 35–60)
HEMOLYSIS INTERF INDEX SERPL-ACNC: 2
ICTERIC INTERF INDEX SERPL-ACNC: 1
LDLC SERPL CALC-MCNC: 163 MG/DL (ref 50–140)
LIPEMIC INTERF INDEX SERPL-ACNC: 3
POTASSIUM SERPL-SCNC: 4.6 MMOL/L (ref 3.5–5.1)
PROT SERPL-MCNC: 8 G/DL (ref 5.8–7.6)
SODIUM SERPL-SCNC: 138 MMOL/L (ref 136–145)
TRIGL SERPL-MCNC: 214 MG/DL (ref 37–140)
VLDLC SERPL CALC-MCNC: 43 MG/DL

## 2022-04-10 ENCOUNTER — HISTORICAL (OUTPATIENT)
Dept: ADMINISTRATIVE | Facility: HOSPITAL | Age: 67
End: 2022-04-10

## 2022-04-22 ENCOUNTER — HISTORICAL (OUTPATIENT)
Dept: ADMINISTRATIVE | Facility: HOSPITAL | Age: 67
End: 2022-04-22

## 2022-04-22 LAB
ABS NEUT (OLG): 4.11 (ref 2.1–9.2)
ALBUMIN SERPL-MCNC: 3.7 G/DL (ref 3.4–4.8)
ALBUMIN/GLOB SERPL: 1.1 {RATIO} (ref 1.1–2)
ALP SERPL-CCNC: 61 U/L (ref 40–150)
ALT SERPL-CCNC: 19 U/L (ref 0–55)
AST SERPL-CCNC: 15 U/L (ref 5–34)
BASOPHILS # BLD AUTO: 0 10*3/UL (ref 0–0.2)
BASOPHILS NFR BLD AUTO: 0 %
BILIRUB SERPL-MCNC: 0.4 MG/DL
BILIRUBIN DIRECT+TOT PNL SERPL-MCNC: 0.2 (ref 0–0.5)
BILIRUBIN DIRECT+TOT PNL SERPL-MCNC: 0.2 (ref 0–0.8)
BUN SERPL-MCNC: 21.2 MG/DL (ref 9.8–20.1)
CALCIUM SERPL-MCNC: 9.9 MG/DL (ref 8.7–10.5)
CHLORIDE SERPL-SCNC: 100 MMOL/L (ref 98–107)
CO2 SERPL-SCNC: 27 MMOL/L (ref 23–31)
CREAT SERPL-MCNC: 1.43 MG/DL (ref 0.55–1.02)
EOSINOPHIL # BLD AUTO: 0.1 10*3/UL (ref 0–0.9)
EOSINOPHIL NFR BLD AUTO: 1 %
ERYTHROCYTE [DISTWIDTH] IN BLOOD BY AUTOMATED COUNT: 12.3 % (ref 11.5–17)
GLOBULIN SER-MCNC: 3.4 G/DL (ref 2.4–3.5)
GLUCOSE SERPL-MCNC: 233 MG/DL (ref 82–115)
HCT VFR BLD AUTO: 33.8 % (ref 37–47)
HEMOLYSIS INTERF INDEX SERPL-ACNC: 7
HGB BLD-MCNC: 11.2 G/DL (ref 12–16)
ICTERIC INTERF INDEX SERPL-ACNC: 0
IMM GRANULOCYTES # BLD AUTO: 0.02 10*3/UL
IMM GRANULOCYTES NFR BLD AUTO: 0 %
LIPEMIC INTERF INDEX SERPL-ACNC: <0
LYMPHOCYTES # BLD AUTO: 0.8 10*3/UL (ref 0.6–4.6)
LYMPHOCYTES NFR BLD AUTO: 14 %
MANUAL DIFF? (OHS): NO
MCH RBC QN AUTO: 31 PG (ref 27–31)
MCHC RBC AUTO-ENTMCNC: 33.1 G/DL (ref 33–36)
MCV RBC AUTO: 93.6 FL (ref 80–94)
MONOCYTES # BLD AUTO: 0.5 10*3/UL (ref 0.1–1.3)
MONOCYTES NFR BLD AUTO: 10 %
NEUTROPHILS # BLD AUTO: 4.11 10*3/UL (ref 2.1–9.2)
NEUTROPHILS NFR BLD AUTO: 74 %
PLATELET # BLD AUTO: 237 10*3/UL (ref 130–400)
PMV BLD AUTO: 10.8 FL (ref 9.4–12.4)
POTASSIUM SERPL-SCNC: 4 MMOL/L (ref 3.5–5.1)
PROT SERPL-MCNC: 7.1 G/DL (ref 5.8–7.6)
RBC # BLD AUTO: 3.61 10*6/UL (ref 4.2–5.4)
SODIUM SERPL-SCNC: 136 MMOL/L (ref 136–145)
WBC # SPEC AUTO: 5.6 10*3/UL (ref 4.5–11.5)

## 2022-04-26 VITALS
HEIGHT: 66 IN | SYSTOLIC BLOOD PRESSURE: 136 MMHG | BODY MASS INDEX: 36.49 KG/M2 | WEIGHT: 227.06 LBS | DIASTOLIC BLOOD PRESSURE: 75 MMHG | OXYGEN SATURATION: 99 %

## 2022-07-14 ENCOUNTER — TELEPHONE (OUTPATIENT)
Dept: FAMILY MEDICINE | Facility: CLINIC | Age: 67
End: 2022-07-14
Payer: MEDICARE

## 2022-07-14 NOTE — TELEPHONE ENCOUNTER
----- Message from Luis E Kiser sent at 7/14/2022  9:32 AM CDT -----  Regarding: RX Request  Type:  Patient Returning Call    Who Called: pt  Who Left Message for Patient: Nurse  Does the patient know what this is regarding?: yes  Would the patient rather a call back or a response via [facility]?  na  Best Call Back Number:alycia  Additional Information:  pt was advised by pharmacy to get status update on the fax that was sent for a medication refill

## 2022-07-14 NOTE — TELEPHONE ENCOUNTER
Patient requesting refill of biest topical cream 1.75 mg/ml    I am not sure why, but I am unable to propose this medication in the message. Please advise on refill request. Patient asked to send refill to [facility] pharmacy

## 2022-07-20 ENCOUNTER — LAB VISIT (OUTPATIENT)
Dept: LAB | Facility: HOSPITAL | Age: 67
End: 2022-07-20
Attending: FAMILY MEDICINE
Payer: MEDICARE

## 2022-07-20 DIAGNOSIS — E66.9 OBESITY, UNSPECIFIED CLASSIFICATION, UNSPECIFIED OBESITY TYPE, UNSPECIFIED WHETHER SERIOUS COMORBIDITY PRESENT: ICD-10-CM

## 2022-07-20 DIAGNOSIS — E11.9 DIABETES MELLITUS WITHOUT COMPLICATION: ICD-10-CM

## 2022-07-20 DIAGNOSIS — Z00.00 ROUTINE GENERAL MEDICAL EXAMINATION AT A HEALTH CARE FACILITY: ICD-10-CM

## 2022-07-20 DIAGNOSIS — R76.8 POSITIVE ANA (ANTINUCLEAR ANTIBODY): Primary | ICD-10-CM

## 2022-07-20 DIAGNOSIS — E78.00 PURE HYPERCHOLESTEROLEMIA: ICD-10-CM

## 2022-07-20 DIAGNOSIS — I10 ESSENTIAL HYPERTENSION, MALIGNANT: ICD-10-CM

## 2022-07-20 DIAGNOSIS — E55.9 AVITAMINOSIS D: ICD-10-CM

## 2022-07-20 LAB
ALBUMIN SERPL-MCNC: 3.9 GM/DL (ref 3.4–4.8)
ALBUMIN/GLOB SERPL: 1.1 RATIO (ref 1.1–2)
ALP SERPL-CCNC: 76 UNIT/L (ref 40–150)
ALT SERPL-CCNC: 15 UNIT/L (ref 0–55)
APPEARANCE UR: CLEAR
AST SERPL-CCNC: 13 UNIT/L (ref 5–34)
BACTERIA #/AREA URNS AUTO: NORMAL /HPF
BASOPHILS # BLD AUTO: 0.03 X10(3)/MCL (ref 0–0.2)
BASOPHILS NFR BLD AUTO: 0.5 %
BILIRUB UR QL STRIP.AUTO: NEGATIVE MG/DL
BILIRUBIN DIRECT+TOT PNL SERPL-MCNC: 0.5 MG/DL
BUN SERPL-MCNC: 22.7 MG/DL (ref 9.8–20.1)
C3 SERPL-MCNC: 134 MG/DL (ref 80–173)
C4 SERPL-MCNC: 32.3 MG/DL (ref 13–46)
CALCIUM SERPL-MCNC: 9.8 MG/DL (ref 8.4–10.2)
CHLORIDE SERPL-SCNC: 101 MMOL/L (ref 98–107)
CHOLEST SERPL-MCNC: 235 MG/DL
CHOLEST/HDLC SERPL: 5 {RATIO} (ref 0–5)
CK SERPL-CCNC: 79 U/L (ref 29–168)
CO2 SERPL-SCNC: 26 MMOL/L (ref 23–31)
COLOR UR AUTO: YELLOW
CREAT SERPL-MCNC: 1.48 MG/DL (ref 0.55–1.02)
CREAT UR-MCNC: 197.3 MG/DL (ref 47–110)
CRP SERPL HS-MCNC: 7.45 MG/L
DEPRECATED CALCIDIOL+CALCIFEROL SERPL-MC: 52.6 NG/ML (ref 30–80)
EOSINOPHIL # BLD AUTO: 0.07 X10(3)/MCL (ref 0–0.9)
EOSINOPHIL NFR BLD AUTO: 1.1 %
ERYTHROCYTE [DISTWIDTH] IN BLOOD BY AUTOMATED COUNT: 12.8 % (ref 11.5–17)
EST. AVERAGE GLUCOSE BLD GHB EST-MCNC: 148.5 MG/DL
GLOBULIN SER-MCNC: 3.6 GM/DL (ref 2.4–3.5)
GLUCOSE SERPL-MCNC: 175 MG/DL (ref 82–115)
GLUCOSE UR QL STRIP.AUTO: NEGATIVE MG/DL
HBA1C MFR BLD: 6.8 %
HCT VFR BLD AUTO: 37.8 % (ref 37–47)
HDLC SERPL-MCNC: 44 MG/DL (ref 35–60)
HGB BLD-MCNC: 12 GM/DL (ref 12–16)
IMM GRANULOCYTES # BLD AUTO: 0.03 X10(3)/MCL (ref 0–0.04)
IMM GRANULOCYTES NFR BLD AUTO: 0.5 %
KETONES UR QL STRIP.AUTO: ABNORMAL MG/DL
LDLC SERPL CALC-MCNC: 165 MG/DL (ref 50–140)
LEUKOCYTE ESTERASE UR QL STRIP.AUTO: NEGATIVE UNIT/L
LYMPHOCYTES # BLD AUTO: 1.01 X10(3)/MCL (ref 0.6–4.6)
LYMPHOCYTES NFR BLD AUTO: 15.7 %
MCH RBC QN AUTO: 30.2 PG (ref 27–31)
MCHC RBC AUTO-ENTMCNC: 31.7 MG/DL (ref 33–36)
MCV RBC AUTO: 95 FL (ref 80–94)
MICROALBUMIN UR-MCNC: 29.3 UG/ML
MICROALBUMIN/CREAT RATIO PNL UR: 14.9 MG/GM CR (ref 0–30)
MONOCYTES # BLD AUTO: 0.47 X10(3)/MCL (ref 0.1–1.3)
MONOCYTES NFR BLD AUTO: 7.3 %
NEUTROPHILS # BLD AUTO: 4.8 X10(3)/MCL (ref 2.1–9.2)
NEUTROPHILS NFR BLD AUTO: 74.9 %
NITRITE UR QL STRIP.AUTO: NEGATIVE
NRBC BLD AUTO-RTO: 0 %
PH UR STRIP.AUTO: 6 [PH]
PHOSPHATE SERPL-MCNC: 3.2 MG/DL (ref 2.3–4.7)
PLATELET # BLD AUTO: 254 X10(3)/MCL (ref 130–400)
PMV BLD AUTO: 10.7 FL (ref 7.4–10.4)
POTASSIUM SERPL-SCNC: 4.4 MMOL/L (ref 3.5–5.1)
PROT SERPL-MCNC: 7.5 GM/DL (ref 5.8–7.6)
PROT UR QL STRIP.AUTO: ABNORMAL MG/DL
RBC # BLD AUTO: 3.98 X10(6)/MCL (ref 4.2–5.4)
RBC #/AREA URNS AUTO: <5 /HPF
RBC UR QL AUTO: NEGATIVE UNIT/L
SODIUM SERPL-SCNC: 136 MMOL/L (ref 136–145)
SP GR UR STRIP.AUTO: 1.02 (ref 1–1.03)
SQUAMOUS #/AREA URNS AUTO: <5 /HPF
TRIGL SERPL-MCNC: 132 MG/DL (ref 37–140)
TSH SERPL-ACNC: 1.61 UIU/ML (ref 0.35–4.94)
URATE SERPL-MCNC: 6.4 MG/DL (ref 2.6–6)
UROBILINOGEN UR STRIP-ACNC: 0.2 MG/DL
VLDLC SERPL CALC-MCNC: 26 MG/DL
WBC # SPEC AUTO: 6.5 X10(3)/MCL (ref 4.5–11.5)
WBC #/AREA URNS AUTO: <5 /HPF

## 2022-07-20 PROCEDURE — 85025 COMPLETE CBC W/AUTO DIFF WBC: CPT

## 2022-07-20 PROCEDURE — 86160 COMPLEMENT ANTIGEN: CPT

## 2022-07-20 PROCEDURE — 80061 LIPID PANEL: CPT

## 2022-07-20 PROCEDURE — 36415 COLL VENOUS BLD VENIPUNCTURE: CPT

## 2022-07-20 PROCEDURE — 84550 ASSAY OF BLOOD/URIC ACID: CPT

## 2022-07-20 PROCEDURE — 86141 C-REACTIVE PROTEIN HS: CPT

## 2022-07-20 PROCEDURE — 80053 COMPREHEN METABOLIC PANEL: CPT

## 2022-07-20 PROCEDURE — 82550 ASSAY OF CK (CPK): CPT

## 2022-07-20 PROCEDURE — 84443 ASSAY THYROID STIM HORMONE: CPT

## 2022-07-20 PROCEDURE — 86225 DNA ANTIBODY NATIVE: CPT

## 2022-07-20 PROCEDURE — 84100 ASSAY OF PHOSPHORUS: CPT

## 2022-07-20 PROCEDURE — 83036 HEMOGLOBIN GLYCOSYLATED A1C: CPT

## 2022-07-20 PROCEDURE — 82306 VITAMIN D 25 HYDROXY: CPT

## 2022-07-20 PROCEDURE — 81001 URINALYSIS AUTO W/SCOPE: CPT

## 2022-07-20 PROCEDURE — 86160 COMPLEMENT ANTIGEN: CPT | Mod: 59

## 2022-07-20 PROCEDURE — 82043 UR ALBUMIN QUANTITATIVE: CPT

## 2022-07-21 ENCOUNTER — TELEPHONE (OUTPATIENT)
Dept: FAMILY MEDICINE | Facility: CLINIC | Age: 67
End: 2022-07-21

## 2022-07-21 ENCOUNTER — OFFICE VISIT (OUTPATIENT)
Dept: FAMILY MEDICINE | Facility: CLINIC | Age: 67
End: 2022-07-21
Payer: MEDICARE

## 2022-07-21 VITALS
TEMPERATURE: 98 F | HEIGHT: 66 IN | OXYGEN SATURATION: 99 % | BODY MASS INDEX: 35.7 KG/M2 | HEART RATE: 65 BPM | RESPIRATION RATE: 16 BRPM | WEIGHT: 222.13 LBS | SYSTOLIC BLOOD PRESSURE: 128 MMHG | DIASTOLIC BLOOD PRESSURE: 76 MMHG

## 2022-07-21 DIAGNOSIS — I10 BENIGN ESSENTIAL HYPERTENSION: ICD-10-CM

## 2022-07-21 DIAGNOSIS — Z12.11 COLON CANCER SCREENING: ICD-10-CM

## 2022-07-21 DIAGNOSIS — Z12.31 BREAST CANCER SCREENING BY MAMMOGRAM: ICD-10-CM

## 2022-07-21 DIAGNOSIS — E78.5 HYPERLIPIDEMIA, UNSPECIFIED HYPERLIPIDEMIA TYPE: ICD-10-CM

## 2022-07-21 DIAGNOSIS — E66.01 SEVERE OBESITY (BMI 35.0-39.9) WITH COMORBIDITY: ICD-10-CM

## 2022-07-21 DIAGNOSIS — E66.9 CLASS 2 OBESITY WITH BODY MASS INDEX (BMI) OF 35.0 TO 35.9 IN ADULT, UNSPECIFIED OBESITY TYPE, UNSPECIFIED WHETHER SERIOUS COMORBIDITY PRESENT: ICD-10-CM

## 2022-07-21 DIAGNOSIS — M10.9 GOUT, UNSPECIFIED CAUSE, UNSPECIFIED CHRONICITY, UNSPECIFIED SITE: ICD-10-CM

## 2022-07-21 DIAGNOSIS — N18.32 STAGE 3B CHRONIC KIDNEY DISEASE: ICD-10-CM

## 2022-07-21 DIAGNOSIS — Z71.89 ADVANCED CARE PLANNING/COUNSELING DISCUSSION: ICD-10-CM

## 2022-07-21 DIAGNOSIS — Z78.0 POSTMENOPAUSAL: ICD-10-CM

## 2022-07-21 DIAGNOSIS — E55.9 VITAMIN D DEFICIENCY: ICD-10-CM

## 2022-07-21 DIAGNOSIS — Z00.00 MEDICARE ANNUAL WELLNESS VISIT, SUBSEQUENT: Primary | ICD-10-CM

## 2022-07-21 DIAGNOSIS — E11.9 TYPE 2 DIABETES MELLITUS WITHOUT COMPLICATION, WITHOUT LONG-TERM CURRENT USE OF INSULIN: ICD-10-CM

## 2022-07-21 DIAGNOSIS — M17.0 OSTEOARTHRITIS OF BOTH KNEES, UNSPECIFIED OSTEOARTHRITIS TYPE: ICD-10-CM

## 2022-07-21 PROBLEM — M17.9 OSTEOARTHRITIS OF KNEE: Status: ACTIVE | Noted: 2022-07-21

## 2022-07-21 PROBLEM — I12.9 BENIGN HYPERTENSIVE KIDNEY DISEASE WITH CHRONIC KIDNEY DISEASE: Status: ACTIVE | Noted: 2021-09-03

## 2022-07-21 PROCEDURE — 99497 PR ADVNCD CARE PLAN 30 MIN: ICD-10-PCS | Mod: 33,,, | Performed by: FAMILY MEDICINE

## 2022-07-21 PROCEDURE — G0439 PR MEDICARE ANNUAL WELLNESS SUBSEQUENT VISIT: ICD-10-PCS | Mod: ,,, | Performed by: FAMILY MEDICINE

## 2022-07-21 PROCEDURE — G0439 PPPS, SUBSEQ VISIT: HCPCS | Mod: ,,, | Performed by: FAMILY MEDICINE

## 2022-07-21 PROCEDURE — 99497 ADVNCD CARE PLAN 30 MIN: CPT | Mod: 33,,, | Performed by: FAMILY MEDICINE

## 2022-07-21 RX ORDER — PRAVASTATIN SODIUM 10 MG/1
10 TABLET ORAL
COMMUNITY
Start: 2022-03-10 | End: 2022-07-21 | Stop reason: SDUPTHER

## 2022-07-21 RX ORDER — LANOLIN ALCOHOL/MO/W.PET/CERES
CREAM (GRAM) TOPICAL
COMMUNITY

## 2022-07-21 RX ORDER — FERROUS SULFATE 325(65) MG
325 TABLET ORAL
COMMUNITY

## 2022-07-21 RX ORDER — LOSARTAN POTASSIUM 100 MG/1
100 TABLET ORAL DAILY
COMMUNITY
Start: 2022-04-16 | End: 2022-07-21 | Stop reason: SDUPTHER

## 2022-07-21 RX ORDER — LOSARTAN POTASSIUM 100 MG/1
100 TABLET ORAL DAILY
Qty: 90 TABLET | Refills: 3 | Status: SHIPPED | OUTPATIENT
Start: 2022-07-21 | End: 2023-01-19

## 2022-07-21 RX ORDER — DULAGLUTIDE 1.5 MG/.5ML
1.5 INJECTION, SOLUTION SUBCUTANEOUS
Qty: 12 PEN | Refills: 3 | Status: SHIPPED | OUTPATIENT
Start: 2022-07-21 | End: 2023-07-10 | Stop reason: SDUPTHER

## 2022-07-21 RX ORDER — VITAMIN B COMPLEX
1 CAPSULE ORAL DAILY
COMMUNITY

## 2022-07-21 RX ORDER — PRAVASTATIN SODIUM 20 MG/1
20 TABLET ORAL NIGHTLY
Qty: 90 TABLET | Refills: 3 | Status: SHIPPED | OUTPATIENT
Start: 2022-07-21 | End: 2023-01-19 | Stop reason: SDUPTHER

## 2022-07-21 RX ORDER — AMLODIPINE BESYLATE 10 MG/1
TABLET ORAL
COMMUNITY
Start: 2021-11-08 | End: 2023-07-25 | Stop reason: SDUPTHER

## 2022-07-21 RX ORDER — DULAGLUTIDE 1.5 MG/.5ML
1.5 INJECTION, SOLUTION SUBCUTANEOUS
COMMUNITY
Start: 2022-06-29 | End: 2022-07-21 | Stop reason: SDUPTHER

## 2022-07-21 NOTE — ASSESSMENT & PLAN NOTE
Lab Results   Component Value Date    HGBA1C 6.8 07/20/2022     Urine micro 7/2022  Foot exam today  Eye exam with dr. Murrieta scheduled 11/2022    On statin and arb. On trulicity. Well controlled.

## 2022-07-21 NOTE — PROGRESS NOTES
Subjective:        Patient ID: Shakila Velazquez is a 66 y.o. female.    Chief Complaint: No chief complaint on file.       Very pleasant patient presents to clinic unaccompanied for her wellness visit.  She did lab work prior to her visit and it was reviewed with patient at time of appt today.    Labs showed concern for gout.  Will work on dietary changes prior to starting meds.     Has CKD.  seeing renal- [facility]- kamron corey NP.  Drinking more water. working outside alot. Saw her 4/2022. started on oral iron 2x/week.  Has appt 10/2022.      she has a history of htn and hld and is on norvasc. on asa. zetia did not work. started niacin mid 8/2021. recent lipid panel improved but still not at goal.  Tolerating pravastatin 10mg daily. Will increase to 20mg    she has history of diabetes. she is on trulicity 1.5mg weekly. last a1c was 6.8 7/2022. Foot exam today. eye exams with dr. castrejon office in 11/2021. note in chart. last urine micro  7/2022. am cbgs 140s. still exercising.    Her mobility form completed was completed 4/2019 due to OA of knees.    The patient has a history of obesity. has been on adipex in the past but not currently.    She had a mammogram in 8/2021 which was normal and dexa done 7/2020 showed osteopenia. She is scheduled 8/17/22. She has an appointment with Dr. Cadet for her colonoscopy but had to reschedule. has had a complete hysterectomy for noncancerous reasons and therefore no longer does pap smears.  She is on bi:est compounded cream.  Needs refill.     She is ALLERGIC to sulfa drugs. She drinks alcohol rarely. She does not smoke. got 2021 flu shot. completed covid series and booster. utd on pneumonia shots. completed shingrix  Series.      Advance Care Planning  Advanced care planning discussed and paperwork given.    I attest that I have had a face to face discussion with patient and or surrogate decision maker.   Included surrogate decision maker: NO  Advanced directive in  "chart: NO  LAPOST: NO    Total time spent:16 minutes          Review of Systems   Constitutional: Negative.    HENT: Negative.    Eyes: Negative.    Respiratory: Negative.    Cardiovascular: Negative.    Gastrointestinal: Negative.    Endocrine: Negative.    Genitourinary: Negative.    Musculoskeletal: Positive for arthralgias.   Allergic/Immunologic: Negative.    Neurological: Negative.    Hematological: Negative.    Psychiatric/Behavioral: Negative.          Review of patient's allergies indicates:   Allergen Reactions    Sulfa (sulfonamide antibiotics) Hives and Itching      Vitals:    07/21/22 0937   BP: 128/76   BP Location: Left arm   Pulse: 65   Resp: 16   Temp: 97.6 °F (36.4 °C)   SpO2: 99%   Weight: 100.7 kg (222 lb 1.6 oz)   Height: 5' 6" (1.676 m)      Social History     Socioeconomic History    Marital status:    Tobacco Use    Smoking status: Never Smoker    Smokeless tobacco: Never Used      History reviewed. No pertinent family history.       Objective:     Physical Exam  Vitals and nursing note reviewed.   Constitutional:       Appearance: Normal appearance. She is obese.   HENT:      Head: Normocephalic and atraumatic.      Nose: Nose normal.      Mouth/Throat:      Mouth: Mucous membranes are moist.      Pharynx: Oropharynx is clear.   Eyes:      Extraocular Movements: Extraocular movements intact.   Cardiovascular:      Rate and Rhythm: Normal rate and regular rhythm.      Pulses: Normal pulses.      Heart sounds: Normal heart sounds.   Pulmonary:      Effort: Pulmonary effort is normal.      Breath sounds: Normal breath sounds.   Musculoskeletal:         General: Normal range of motion.      Cervical back: Normal range of motion.        Feet:    Feet:      Comments: Normal diabetic foot exam  Skin:     General: Skin is warm and dry.   Neurological:      General: No focal deficit present.      Mental Status: She is alert and oriented to person, place, and time. Mental status is at " baseline.   Psychiatric:         Mood and Affect: Mood normal.       Current Outpatient Medications on File Prior to Visit   Medication Sig Dispense Refill    amLODIPine (NORVASC) 10 MG tablet TAKE 1 TABLET(10 MG) BY MOUTH 1 TIME EACH DAY      [DISCONTINUED] pravastatin (PRAVACHOL) 10 MG tablet Take 10 mg by mouth.      b complex vitamins capsule Take 1 capsule by mouth once daily.      ferrous sulfate (FEOSOL) 325 mg (65 mg iron) Tab tablet Take 325 mg by mouth.      medical supply, miscellaneous (MISCELLANEOUS CREAMS TOP) Apply 0.5 mLs topically every evening. Biest 1.75 mg/ml      niacin (ENDUR-ACIN) 500 mg TbSR       omega-3s/dha/epa/fish oil/D3 (VITAMIN-D + OMEGA-3 ORAL) Take by mouth.      [DISCONTINUED] losartan (COZAAR) 100 MG tablet Take 100 mg by mouth once daily.      [DISCONTINUED] TRULICITY 1.5 mg/0.5 mL pen injector Inject 1.5 mg into the skin every 7 days.       No current facility-administered medications on file prior to visit.     Health Maintenance   Topic Date Due    Hepatitis C Screening  Never done    Foot Exam  Never done    Mammogram  08/02/2022    Hemoglobin A1c  10/20/2022    Eye Exam  11/11/2022    Lipid Panel  07/20/2023    Low Dose Statin  07/21/2023    DEXA Scan  07/29/2023    TETANUS VACCINE  08/01/2024      Results for orders placed or performed in visit on 07/20/22   Urinalysis   Result Value Ref Range    Color, UA Yellow Yellow, Colorless, Other, Clear    Appearance, UA Clear Clear    Specific Gravity, UA 1.019 1.001 - 1.030    pH, UA 6.0 5.0, 5.5, 6.0, 6.5, 7.0, 7.5, 8.0, 8.5    Protein, UA Trace (A) Negative, 300  mg/dL    Glucose, UA Negative Negative, Normal mg/dL    Ketones, UA Trace (A) Negative, +1, +2, +3, +4, +5, >=160, >=80 mg/dL    Blood, UA Negative Negative unit/L    Bilirubin, UA Negative Negative mg/dL    Urobilinogen, UA 0.2 0.2, 1.0, Normal mg/dL    Nitrites, UA Negative Negative    Leukocyte Esterase, UA Negative Negative, 75  unit/L    Microalbumin/Creatinine Ratio, Urine   Result Value Ref Range    Urine Microalbumin 29.3 <=30.0 ug/ml    Urine Creatinine 197.3 (H) 47.0 - 110.0 mg/dL    Microalbumin Creatinine Ratio 14.9 0.0 - 30.0 mg/gm Cr   Comprehensive Metabolic Panel   Result Value Ref Range    Sodium Level 136 136 - 145 mmol/L    Potassium Level 4.4 3.5 - 5.1 mmol/L    Chloride 101 98 - 107 mmol/L    Carbon Dioxide 26 23 - 31 mmol/L    Glucose Level 175 (H) 82 - 115 mg/dL    Blood Urea Nitrogen 22.7 (H) 9.8 - 20.1 mg/dL    Creatinine 1.48 (H) 0.55 - 1.02 mg/dL    Calcium Level Total 9.8 8.4 - 10.2 mg/dL    Protein Total 7.5 5.8 - 7.6 gm/dL    Albumin Level 3.9 3.4 - 4.8 gm/dL    Globulin 3.6 (H) 2.4 - 3.5 gm/dL    Albumin/Globulin Ratio 1.1 1.1 - 2.0 ratio    Bilirubin Total 0.5 <=1.5 mg/dL    Alkaline Phosphatase 76 40 - 150 unit/L    Alanine Aminotransferase 15 0 - 55 unit/L    Aspartate Aminotransferase 13 5 - 34 unit/L    Estimated GFR- 45 mls/min/1.73/m2   Hemoglobin A1C   Result Value Ref Range    Hemoglobin A1c 6.8 <=7.0 %    Estimated Average Glucose 148.5 mg/dL   Lipid Panel   Result Value Ref Range    Cholesterol Total 235 (H) <=200 mg/dL    HDL Cholesterol 44 35 - 60 mg/dL    Triglyceride 132 37 - 140 mg/dL    Cholesterol/HDL Ratio 5 0 - 5    Very Low Density Lipoprotein 26     LDL Cholesterol 165.00 (H) 50.00 - 140.00 mg/dL   TSH   Result Value Ref Range    Thyroid Stimulating Hormone 1.6062 0.3500 - 4.9400 uIU/mL   Vitamin D   Result Value Ref Range    Vit D 25 OH 52.6 30.0 - 80.0 ng/mL   CRP, High Sensitivity   Result Value Ref Range    C-Reactive Protein High Sensitivity 7.45 (H) <=5.00 mg/L   CK   Result Value Ref Range    Creatine Kinase 79 29 - 168 U/L   Phosphorus   Result Value Ref Range    Phosphorus Level 3.2 2.3 - 4.7 mg/dL   Uric Acid   Result Value Ref Range    Uric Acid 6.4 (H) 2.6 - 6.0 mg/dL   C4 Complement   Result Value Ref Range    C4 Complement 32.3 13.0 - 46.0 mg/dL   C3 Complement   Result  Value Ref Range    C3 Complement 134 80 - 173 mg/dL   CBC with Differential   Result Value Ref Range    WBC 6.5 4.5 - 11.5 x10(3)/mcL    RBC 3.98 (L) 4.20 - 5.40 x10(6)/mcL    Hgb 12.0 12.0 - 16.0 gm/dL    Hct 37.8 37.0 - 47.0 %    MCV 95.0 (H) 80.0 - 94.0 fL    MCH 30.2 27.0 - 31.0 pg    MCHC 31.7 (L) 33.0 - 36.0 mg/dL    RDW 12.8 11.5 - 17.0 %    Platelet 254 130 - 400 x10(3)/mcL    MPV 10.7 (H) 7.4 - 10.4 fL    Neut % 74.9 %    Lymph % 15.7 %    Mono % 7.3 %    Eos % 1.1 %    Basophil % 0.5 %    Lymph # 1.01 0.6 - 4.6 x10(3)/mcL    Neut # 4.8 2.1 - 9.2 x10(3)/mcL    Mono # 0.47 0.1 - 1.3 x10(3)/mcL    Eos # 0.07 0 - 0.9 x10(3)/mcL    Baso # 0.03 0 - 0.2 x10(3)/mcL    IG# 0.03 0 - 0.04 x10(3)/mcL    IG% 0.5 %    NRBC% 0.0 %   Urinalysis, Microscopic   Result Value Ref Range    RBC, UA <5 <=5 /HPF    WBC, UA <5 <=5 /HPF    Squamous Epithelial Cells, UA <5 <=5 /HPF    Bacteria, UA None Seen None Seen, Rare, Occasional /HPF          Assessment & Plan:     Active Problem List with Overview Notes    Diagnosis Date Noted    Medicare annual wellness visit, subsequent 07/21/2022    Postmenopausal 07/21/2022    Breast cancer screening by mammogram 07/21/2022    Benign essential hypertension 07/21/2022    Vitamin D deficiency 07/21/2022    Type 2 diabetes mellitus 07/21/2022    Advanced care planning/counseling discussion 07/21/2022    Osteoarthritis of knee 07/21/2022    Gout 07/21/2022    Colon cancer screening 07/21/2022    Severe obesity (BMI 35.0-39.9) with comorbidity     Benign hypertensive kidney disease with chronic kidney disease 09/03/2021     Last Assessment & Plan:   Formatting of this note might be different from the original.  BP significantly improved.  Continue current regimen.  Continue home BP monitoring.      Hyperlipidemia 09/03/2021    Stage 3b chronic kidney disease 09/03/2021     Last Assessment & Plan:   Formatting of this note might be different from the original.  Renal indices  remain stable at baseline.         1. Medicare annual wellness visit, subsequent  Assessment & Plan:  Previously done labs reviewed at time of visit today  mmg and dexa scheduled  Referral for colonoscopy with dr. bhandari placed  No longer does pap smear    Advanced care planning discussed and paperwork given.      2. Advanced care planning/counseling discussion  Assessment & Plan:  Advanced care planning discussed and paperwork given.    I attest that I have had a face to face discussion with patient and or surrogate decision maker.   Included surrogate decision maker: NO  Advanced directive in chart: NO  LAPOST: NO    Total time spent: 16 minutes        3. Type 2 diabetes mellitus without complication, without long-term current use of insulin  Assessment & Plan:  Lab Results   Component Value Date    HGBA1C 6.8 07/20/2022     Urine micro 7/2022  Foot exam today  Eye exam with dr. Murrieta scheduled 11/2022    On statin and arb. On trulicity. Well controlled.       4. Class 2 obesity with body mass index (BMI) of 35.0 to 35.9 in adult, unspecified obesity type, unspecified whether serious comorbidity present  Assessment & Plan:  Encourage lifestyle change      5. Vitamin D deficiency  Assessment & Plan:  Cont to supplement otc      6. Stage 3b chronic kidney disease  Overview:  Last Assessment & Plan:   Formatting of this note might be different from the original.  Renal indices remain stable at baseline.    Assessment & Plan:  Keep scheduled appointments with renal. Encourage fluids. On oral iron      7. Postmenopausal  Assessment & Plan:  dexa scheduled 8/2022      8. Breast cancer screening by mammogram  Assessment & Plan:  mmg scheduled 8/2022      9. Hyperlipidemia, unspecified hyperlipidemia type  Assessment & Plan:  Will increase pravastatin to 20mg.  Continue on niacin and fish oil. Did not tolerate zetia      10. Benign essential hypertension  Assessment & Plan:  Well controlled on current meds.       11. Gout,  unspecified cause, unspecified chronicity, unspecified site  Assessment & Plan:  Will make dietary changes. Monitor symptoms. Contact clinic for concerns.       12. Severe obesity (BMI 35.0-39.9) with comorbidity  Assessment & Plan:  Encourage lifestyle change      13. Osteoarthritis of both knees, unspecified osteoarthritis type  Assessment & Plan:  stable      14. Colon cancer screening  Assessment & Plan:  Referral for colonoscopy placed    Orders:  -     Ambulatory referral/consult to Gastroenterology    Other orders  -     losartan (COZAAR) 100 MG tablet  -     TRULICITY 1.5 mg/0.5 mL pen injector  -     pravastatin (PRAVACHOL) 20 MG tablet       Follow up in about 6 months (around 1/21/2023) for chronic conditions.

## 2022-07-21 NOTE — TELEPHONE ENCOUNTER
Patient needs her BI:est cream from [facility] in [place] refilled.  I am unable to approve it in computer. Please call and have them fax request or ok to give verbal refill approval.

## 2022-07-21 NOTE — ASSESSMENT & PLAN NOTE
Electrodesiccation Text: The wound bed was treated with electrodesiccation after the biopsy was performed. Will increase pravastatin to 20mg.  Continue on niacin and fish oil. Did not tolerate zetia   Silver Nitrate Text: The wound bed was treated with silver nitrate after the biopsy was performed. Bill [id number] For Specimen Handling/Conveyance To Laboratory?: no Lab Facility: [id number] Notification Instructions: Patient will be notified of biopsy results. However, patient instructed to call the office if not contacted within 2 weeks. Lab: [id number] Billing Type: Third-Party Bill Anesthesia Type: 1% lidocaine with epinephrine Depth Of Biopsy: dermis Consent: Written consent was obtained and risks were reviewed including but not limited to scarring, infection, bleeding, scabbing, incomplete removal, nerve damage and allergy to anesthesia. Additional Anesthesia Volume In Cc (Will Not Render If 0): 0 Electrodesiccation And Curettage Text: The wound bed was treated with electrodesiccation and curettage after the biopsy was performed. Hemostasis: Drysol Post-Care Instructions: I reviewed with the patient in detail post-care instructions. Patient is to keep the biopsy site dry overnight, and then apply bacitracin twice daily until healed. Patient may apply hydrogen peroxide soaks to remove any crusting. Detail Level: Detailed Dressing: bandage Cryotherapy Text: The wound bed was treated with cryotherapy after the biopsy was performed. Anesthesia Volume In Cc (Will Not Render If 0): 0.5 Wound Care: Petrolatum Type Of Destruction Used: Curettage Biopsy Method: Dermablade Biopsy Type: H and E Was A Bandage Applied: Yes Curettage Text: The wound bed was treated with curettage after the biopsy was performed. Lab Facility: [id number] Billing Type: Third-Party Bill Lab: [id number] Body Location Override (Optional - Billing Will Still Be Based On Selected Body Map Location If Applicable): left pectorals

## 2022-07-21 NOTE — PROGRESS NOTES
"These results have been reviewed by your healthcare team.  You are advised to continue your current medications.    Labs are within normal limits except  >ua shows some protein and ketones. Encourage fluids.   >bun and creatinine are elevated. Drink more water. monitor  >spot glucose was 175. But a1c is 6.8. great job. Cont on dmii meds  >TC and LDL are still not at goal  >uric acid and crp are elevated.  She may have gout.  We can consider trying med for gout and see if this helps her joint symptoms...  >no anemia.      Please keep in mind that results may often be outside of the "normal" range while still being acceptable for your diagnosis and your plan of care.  You will be contacted if your results require a change in your medical treatment. If you wish to discuss your results, you will be required to schedule an appointment.   "

## 2022-07-21 NOTE — ASSESSMENT & PLAN NOTE
Previously done labs reviewed at time of visit today  mmg and dexa scheduled  Referral for colonoscopy with dr. bhandari placed  No longer does pap smear    Advanced care planning discussed and paperwork given.

## 2022-07-22 LAB — DSDNA ANTIBODY (OHS): POSITIVE

## 2022-07-27 ENCOUNTER — LAB VISIT (OUTPATIENT)
Dept: LAB | Facility: HOSPITAL | Age: 67
End: 2022-07-27
Attending: INTERNAL MEDICINE
Payer: MEDICARE

## 2022-07-27 DIAGNOSIS — Z79.899 ENCOUNTER FOR LONG-TERM (CURRENT) USE OF OTHER MEDICATIONS: ICD-10-CM

## 2022-07-27 DIAGNOSIS — R76.8 FALSE POSITIVE SEROLOGICAL TEST FOR SYPHILIS: ICD-10-CM

## 2022-07-27 DIAGNOSIS — R79.82 ELEVATED C-REACTIVE PROTEIN (CRP): Primary | ICD-10-CM

## 2022-07-27 LAB
CRP SERPL-MCNC: 4.1 MG/L
ERYTHROCYTE [SEDIMENTATION RATE] IN BLOOD: 27 MM/HR (ref 0–20)

## 2022-07-27 PROCEDURE — 85651 RBC SED RATE NONAUTOMATED: CPT

## 2022-07-27 PROCEDURE — 86140 C-REACTIVE PROTEIN: CPT

## 2022-07-27 PROCEDURE — 36415 COLL VENOUS BLD VENIPUNCTURE: CPT

## 2022-08-17 ENCOUNTER — HOSPITAL ENCOUNTER (OUTPATIENT)
Dept: RADIOLOGY | Facility: HOSPITAL | Age: 67
Discharge: HOME | End: 2022-08-17
Attending: FAMILY MEDICINE
Payer: MEDICARE

## 2022-08-17 DIAGNOSIS — Z78.0 POSTMENOPAUSAL: ICD-10-CM

## 2022-08-17 DIAGNOSIS — Z12.31 BREAST CANCER SCREENING BY MAMMOGRAM: ICD-10-CM

## 2022-08-17 PROCEDURE — 77067 SCR MAMMO BI INCL CAD: CPT | Mod: 26,,, | Performed by: RADIOLOGY

## 2022-08-17 PROCEDURE — 77063 BREAST TOMOSYNTHESIS BI: CPT | Mod: 26,,, | Performed by: RADIOLOGY

## 2022-08-17 PROCEDURE — 77080 DEXA BONE DENSITY SPINE HIP: ICD-10-PCS | Mod: 26,,, | Performed by: RADIOLOGY

## 2022-08-17 PROCEDURE — 77067 MAMMO DIGITAL SCREENING BILAT WITH TOMO: ICD-10-PCS | Mod: 26,,, | Performed by: RADIOLOGY

## 2022-08-17 PROCEDURE — 77063 MAMMO DIGITAL SCREENING BILAT WITH TOMO: ICD-10-PCS | Mod: 26,,, | Performed by: RADIOLOGY

## 2022-08-17 PROCEDURE — 77080 DXA BONE DENSITY AXIAL: CPT | Mod: TC

## 2022-08-17 PROCEDURE — 77067 SCR MAMMO BI INCL CAD: CPT | Mod: TC

## 2022-08-17 PROCEDURE — 77080 DXA BONE DENSITY AXIAL: CPT | Mod: 26,,, | Performed by: RADIOLOGY

## 2022-08-22 NOTE — PROGRESS NOTES
Please call patient to inform her DEXA shows osteopenia.  Encourage calcium 1200mg and vitamin d3 1000IU OTC daily. Encourage weight bearing exercises.  Will repeat DEXA in 2 years.

## 2022-09-28 ENCOUNTER — CLINICAL SUPPORT (OUTPATIENT)
Dept: FAMILY MEDICINE | Facility: CLINIC | Age: 67
End: 2022-09-28
Payer: MEDICARE

## 2022-09-28 DIAGNOSIS — Z23 NEEDS FLU SHOT: Primary | ICD-10-CM

## 2022-09-28 PROCEDURE — G0008 ADMIN INFLUENZA VIRUS VAC: HCPCS | Mod: ,,, | Performed by: FAMILY MEDICINE

## 2022-09-28 PROCEDURE — 90694 FLU VACCINE - QUADRIVALENT - ADJUVANTED: ICD-10-PCS | Mod: ,,, | Performed by: FAMILY MEDICINE

## 2022-09-28 PROCEDURE — 90694 VACC AIIV4 NO PRSRV 0.5ML IM: CPT | Mod: ,,, | Performed by: FAMILY MEDICINE

## 2022-09-28 PROCEDURE — G0008 FLU VACCINE - QUADRIVALENT - ADJUVANTED: ICD-10-PCS | Mod: ,,, | Performed by: FAMILY MEDICINE

## 2022-10-21 ENCOUNTER — LAB VISIT (OUTPATIENT)
Dept: LAB | Facility: HOSPITAL | Age: 67
End: 2022-10-21
Payer: MEDICARE

## 2022-10-21 DIAGNOSIS — N18.32 CHRONIC KIDNEY DISEASE (CKD) STAGE G3B/A1, MODERATELY DECREASED GLOMERULAR FILTRATION RATE (GFR) BETWEEN 30-44 ML/MIN/1.73 SQUARE METER AND ALBUMINURIA CREATININE RATIO LESS THAN 30 MG/G: Primary | ICD-10-CM

## 2022-10-21 DIAGNOSIS — I12.9 PARENCHYMAL RENAL HYPERTENSION: ICD-10-CM

## 2022-10-21 DIAGNOSIS — E55.9 AVITAMINOSIS D: ICD-10-CM

## 2022-10-21 DIAGNOSIS — E21.3 HYPERPARATHYROIDISM, UNSPECIFIED: ICD-10-CM

## 2022-10-21 DIAGNOSIS — R80.9 PROTEINURIA, UNSPECIFIED TYPE: ICD-10-CM

## 2022-10-21 LAB
ALBUMIN SERPL-MCNC: 3.8 GM/DL (ref 3.4–4.8)
ALBUMIN/GLOB SERPL: 1.1 RATIO (ref 1.1–2)
ALP SERPL-CCNC: 66 UNIT/L (ref 40–150)
ALT SERPL-CCNC: 15 UNIT/L (ref 0–55)
AST SERPL-CCNC: 12 UNIT/L (ref 5–34)
BASOPHILS # BLD AUTO: 0.04 X10(3)/MCL (ref 0–0.2)
BASOPHILS NFR BLD AUTO: 0.7 %
BILIRUBIN DIRECT+TOT PNL SERPL-MCNC: 0.5 MG/DL
BUN SERPL-MCNC: 20.7 MG/DL (ref 9.8–20.1)
CALCIUM SERPL-MCNC: 10.2 MG/DL (ref 8.4–10.2)
CHLORIDE SERPL-SCNC: 101 MMOL/L (ref 98–107)
CO2 SERPL-SCNC: 27 MMOL/L (ref 23–31)
CREAT SERPL-MCNC: 1.57 MG/DL (ref 0.55–1.02)
CREAT UR-MCNC: 302 MG/DL (ref 47–110)
DEPRECATED CALCIDIOL+CALCIFEROL SERPL-MC: 46.8 NG/ML (ref 30–80)
EOSINOPHIL # BLD AUTO: 0.11 X10(3)/MCL (ref 0–0.9)
EOSINOPHIL NFR BLD AUTO: 1.8 %
ERYTHROCYTE [DISTWIDTH] IN BLOOD BY AUTOMATED COUNT: 12.7 % (ref 11.5–17)
GFR SERPLBLD CREATININE-BSD FMLA CKD-EPI: 36 MLS/MIN/1.73/M2
GLOBULIN SER-MCNC: 3.4 GM/DL (ref 2.4–3.5)
GLUCOSE SERPL-MCNC: 254 MG/DL (ref 82–115)
HCT VFR BLD AUTO: 35 % (ref 37–47)
HGB BLD-MCNC: 11 GM/DL (ref 12–16)
IMM GRANULOCYTES # BLD AUTO: 0.02 X10(3)/MCL (ref 0–0.04)
IMM GRANULOCYTES NFR BLD AUTO: 0.3 %
LYMPHOCYTES # BLD AUTO: 1.04 X10(3)/MCL (ref 0.6–4.6)
LYMPHOCYTES NFR BLD AUTO: 17.4 %
MAGNESIUM SERPL-MCNC: 1.7 MG/DL (ref 1.6–2.6)
MCH RBC QN AUTO: 30.3 PG (ref 27–31)
MCHC RBC AUTO-ENTMCNC: 31.4 MG/DL (ref 33–36)
MCV RBC AUTO: 96.4 FL (ref 80–94)
MONOCYTES # BLD AUTO: 0.41 X10(3)/MCL (ref 0.1–1.3)
MONOCYTES NFR BLD AUTO: 6.9 %
NEUTROPHILS # BLD AUTO: 4.4 X10(3)/MCL (ref 2.1–9.2)
NEUTROPHILS NFR BLD AUTO: 72.9 %
NRBC BLD AUTO-RTO: 0 %
PHOSPHATE SERPL-MCNC: 3.1 MG/DL (ref 2.3–4.7)
PLATELET # BLD AUTO: 225 X10(3)/MCL (ref 130–400)
PMV BLD AUTO: 10.9 FL (ref 7.4–10.4)
POTASSIUM SERPL-SCNC: 4.3 MMOL/L (ref 3.5–5.1)
PROT SERPL-MCNC: 7.2 GM/DL (ref 5.8–7.6)
PROT UR STRIP-MCNC: 17.6 MG/DL
PROT/CREAT UR-RTO: 58.3 MG/GM CR
PTH-INTACT SERPL-MCNC: 56.9 PG/ML (ref 8.7–77)
RBC # BLD AUTO: 3.63 X10(6)/MCL (ref 4.2–5.4)
SODIUM SERPL-SCNC: 137 MMOL/L (ref 136–145)
WBC # SPEC AUTO: 6 X10(3)/MCL (ref 4.5–11.5)

## 2022-10-21 PROCEDURE — 84100 ASSAY OF PHOSPHORUS: CPT

## 2022-10-21 PROCEDURE — 80053 COMPREHEN METABOLIC PANEL: CPT

## 2022-10-21 PROCEDURE — 83970 ASSAY OF PARATHORMONE: CPT

## 2022-10-21 PROCEDURE — 85025 COMPLETE CBC W/AUTO DIFF WBC: CPT

## 2022-10-21 PROCEDURE — 82306 VITAMIN D 25 HYDROXY: CPT

## 2022-10-21 PROCEDURE — 83735 ASSAY OF MAGNESIUM: CPT

## 2022-10-21 PROCEDURE — 36415 COLL VENOUS BLD VENIPUNCTURE: CPT

## 2022-10-21 PROCEDURE — 82570 ASSAY OF URINE CREATININE: CPT

## 2022-10-24 PROBLEM — Z00.00 MEDICARE ANNUAL WELLNESS VISIT, SUBSEQUENT: Status: RESOLVED | Noted: 2022-07-21 | Resolved: 2022-10-24

## 2022-11-09 ENCOUNTER — DOCUMENTATION ONLY (OUTPATIENT)
Dept: ADMINISTRATIVE | Facility: HOSPITAL | Age: 67
End: 2022-11-09
Payer: MEDICARE

## 2022-11-22 ENCOUNTER — APPOINTMENT (OUTPATIENT)
Dept: LAB | Facility: HOSPITAL | Age: 67
End: 2022-11-22
Attending: NURSE PRACTITIONER
Payer: MEDICARE

## 2022-11-22 DIAGNOSIS — E11.22 TYPE 2 DIABETES MELLITUS WITH END-STAGE RENAL DISEASE: ICD-10-CM

## 2022-11-22 DIAGNOSIS — I12.9 HYPERTENSIVE NEPHROPATHY: Primary | ICD-10-CM

## 2022-11-22 DIAGNOSIS — N18.6 TYPE 2 DIABETES MELLITUS WITH END-STAGE RENAL DISEASE: ICD-10-CM

## 2022-11-22 DIAGNOSIS — R80.9 PROTEINURIA, UNSPECIFIED TYPE: ICD-10-CM

## 2022-11-22 LAB
ANION GAP SERPL CALC-SCNC: 10 MEQ/L
BUN SERPL-MCNC: 33.4 MG/DL (ref 9.8–20.1)
CALCIUM SERPL-MCNC: 11.1 MG/DL (ref 8.4–10.2)
CHLORIDE SERPL-SCNC: 100 MMOL/L (ref 98–107)
CO2 SERPL-SCNC: 27 MMOL/L (ref 23–31)
CREAT SERPL-MCNC: 1.49 MG/DL (ref 0.55–1.02)
CREAT UR-MCNC: 86.9 MG/DL (ref 47–110)
CREAT/UREA NIT SERPL: 22
GFR SERPLBLD CREATININE-BSD FMLA CKD-EPI: 38 MLS/MIN/1.73/M2
GLUCOSE SERPL-MCNC: 101 MG/DL (ref 82–115)
POTASSIUM SERPL-SCNC: 4.3 MMOL/L (ref 3.5–5.1)
PROT UR STRIP-MCNC: 7.5 MG/DL
SODIUM SERPL-SCNC: 137 MMOL/L (ref 136–145)
URINE PROTEIN/CREATININE RATIO (OHS): 0.1

## 2022-11-22 PROCEDURE — 80048 BASIC METABOLIC PNL TOTAL CA: CPT

## 2022-11-22 PROCEDURE — 82570 ASSAY OF URINE CREATININE: CPT

## 2022-11-22 PROCEDURE — 36415 COLL VENOUS BLD VENIPUNCTURE: CPT

## 2022-12-19 ENCOUNTER — LAB VISIT (OUTPATIENT)
Dept: LAB | Facility: HOSPITAL | Age: 67
End: 2022-12-19
Attending: NURSE PRACTITIONER
Payer: MEDICARE

## 2022-12-19 ENCOUNTER — DOCUMENTATION ONLY (OUTPATIENT)
Dept: INTERNAL MEDICINE | Facility: CLINIC | Age: 67
End: 2022-12-19
Payer: MEDICARE

## 2022-12-19 DIAGNOSIS — N18.32 CHRONIC KIDNEY DISEASE (CKD) STAGE G3B/A1, MODERATELY DECREASED GLOMERULAR FILTRATION RATE (GFR) BETWEEN 30-44 ML/MIN/1.73 SQUARE METER AND ALBUMINURIA CREATININE RATIO LESS THAN 30 MG/G: Primary | ICD-10-CM

## 2022-12-19 DIAGNOSIS — E83.52 HYPERCALCEMIA: ICD-10-CM

## 2022-12-19 LAB
ANION GAP SERPL CALC-SCNC: 9 MEQ/L
BUN SERPL-MCNC: 23.1 MG/DL (ref 9.8–20.1)
CALCIUM SERPL-MCNC: 10 MG/DL (ref 8.4–10.2)
CHLORIDE SERPL-SCNC: 103 MMOL/L (ref 98–107)
CO2 SERPL-SCNC: 24 MMOL/L (ref 23–31)
CREAT SERPL-MCNC: 1.5 MG/DL (ref 0.55–1.02)
CREAT/UREA NIT SERPL: 15
GFR SERPLBLD CREATININE-BSD FMLA CKD-EPI: 38 MLS/MIN/1.73/M2
GLUCOSE SERPL-MCNC: 141 MG/DL (ref 82–115)
POTASSIUM SERPL-SCNC: 3.9 MMOL/L (ref 3.5–5.1)
SODIUM SERPL-SCNC: 136 MMOL/L (ref 136–145)

## 2022-12-19 PROCEDURE — 80048 BASIC METABOLIC PNL TOTAL CA: CPT

## 2022-12-19 PROCEDURE — 36415 COLL VENOUS BLD VENIPUNCTURE: CPT

## 2022-12-22 ENCOUNTER — LAB VISIT (OUTPATIENT)
Dept: LAB | Facility: HOSPITAL | Age: 67
End: 2022-12-22
Payer: MEDICARE

## 2022-12-22 DIAGNOSIS — E83.52 HYPERCALCEMIA: ICD-10-CM

## 2022-12-22 DIAGNOSIS — N18.32 CHRONIC KIDNEY DISEASE (CKD) STAGE G3B/A1, MODERATELY DECREASED GLOMERULAR FILTRATION RATE (GFR) BETWEEN 30-44 ML/MIN/1.73 SQUARE METER AND ALBUMINURIA CREATININE RATIO LESS THAN 30 MG/G: Primary | ICD-10-CM

## 2022-12-22 PROCEDURE — 86335 IMMUNFIX E-PHORSIS/URINE/CSF: CPT

## 2023-01-03 LAB
ALBUMIN 24H UR ELPH-MRATE: 28.8 MG/24 H
ALBUMIN/GLOB 24H UR ELPH: 0.57 {RATIO}
ALPHA1 GLOB 24H UR ELPH-MRATE: 6.4 MG/24 H
ALPHA2 GLOB 24H UR ELPH-MRATE: 12.8 MG/24 H
B-GLOBULIN 24H UR ELPH-MRATE: 13.6 MG/24 H
COLLECT DURATION TIME UR: 24 H
GAMMA GLOB 24H UR ELPH-MRATE: 17.6 MG/24 H
INTERPRETATION 24H UR IFE-IMP: NORMAL
M IMMUNOFIXATION FLAG, 24 HR, U: NEGATIVE
PROT 24H UR-MRATE: 80 MG/24 H
PROT PATTERN 24H UR ELPH-IMP: NORMAL
SPECIMEN VOL ?TM UR: 2000 ML

## 2023-01-19 ENCOUNTER — TELEPHONE (OUTPATIENT)
Dept: FAMILY MEDICINE | Facility: CLINIC | Age: 68
End: 2023-01-19

## 2023-01-19 ENCOUNTER — OFFICE VISIT (OUTPATIENT)
Dept: FAMILY MEDICINE | Facility: CLINIC | Age: 68
End: 2023-01-19
Payer: MEDICARE

## 2023-01-19 VITALS
DIASTOLIC BLOOD PRESSURE: 82 MMHG | HEIGHT: 66 IN | OXYGEN SATURATION: 98 % | SYSTOLIC BLOOD PRESSURE: 118 MMHG | WEIGHT: 216.88 LBS | HEART RATE: 82 BPM | RESPIRATION RATE: 16 BRPM | BODY MASS INDEX: 34.86 KG/M2 | TEMPERATURE: 98 F

## 2023-01-19 DIAGNOSIS — I10 BENIGN ESSENTIAL HYPERTENSION: ICD-10-CM

## 2023-01-19 DIAGNOSIS — E11.59 HYPERTENSION ASSOCIATED WITH DIABETES: ICD-10-CM

## 2023-01-19 DIAGNOSIS — E78.5 HYPERLIPIDEMIA ASSOCIATED WITH TYPE 2 DIABETES MELLITUS: ICD-10-CM

## 2023-01-19 DIAGNOSIS — E11.65 TYPE 2 DIABETES MELLITUS WITH HYPERGLYCEMIA, WITHOUT LONG-TERM CURRENT USE OF INSULIN: Primary | ICD-10-CM

## 2023-01-19 DIAGNOSIS — E66.01 SEVERE OBESITY (BMI 35.0-39.9) WITH COMORBIDITY: ICD-10-CM

## 2023-01-19 DIAGNOSIS — E11.69 HYPERLIPIDEMIA ASSOCIATED WITH TYPE 2 DIABETES MELLITUS: ICD-10-CM

## 2023-01-19 DIAGNOSIS — N18.32 STAGE 3B CHRONIC KIDNEY DISEASE: ICD-10-CM

## 2023-01-19 DIAGNOSIS — I15.2 HYPERTENSION ASSOCIATED WITH DIABETES: ICD-10-CM

## 2023-01-19 PROCEDURE — 99214 PR OFFICE/OUTPT VISIT, EST, LEVL IV, 30-39 MIN: ICD-10-PCS | Mod: ,,, | Performed by: FAMILY MEDICINE

## 2023-01-19 PROCEDURE — 99214 OFFICE O/P EST MOD 30 MIN: CPT | Mod: ,,, | Performed by: FAMILY MEDICINE

## 2023-01-19 RX ORDER — LOSARTAN POTASSIUM 50 MG/1
50 TABLET ORAL DAILY
Qty: 90 TABLET | Refills: 3 | Status: SHIPPED | OUTPATIENT
Start: 2023-01-19 | End: 2023-07-25 | Stop reason: SDUPTHER

## 2023-01-19 RX ORDER — PRAVASTATIN SODIUM 20 MG/1
20 TABLET ORAL NIGHTLY
Qty: 90 TABLET | Refills: 3 | Status: SHIPPED | OUTPATIENT
Start: 2023-01-19 | End: 2023-07-25 | Stop reason: SDUPTHER

## 2023-01-19 NOTE — PROGRESS NOTES
Subjective:        Patient ID: Shakila Velazuqez is a 67 y.o. female.    Chief Complaint: Follow-up (6 month follow up)      Very pleasant patient presents to clinic unaccompanied for follow up.  She is due for her wellness visit in July    Wellness labs 7/2022 showed concern for gout.  Will work on dietary changes prior to starting meds.      Has CKD.  seeing renal- [facility]- kamron corey NP.  Drinking more water. working outside alot. Saw her 4/2022. started on oral iron 2x/week. At her last appt 12/2022 did labs.  They had her decrease her calcium and losartan dosing by 1/2. Has follow up 6/2023.       she has htn and hld and is on norvasc. on asa. zetia did not work. started niacin mid 8/2021. Tolerating pravastatin 20mg daily.      she has diabetes. she is on trulicity 1.5mg weekly. last a1c was 6.8 7/2022. Foot exam 7/2022. eye exams with dr. castrejon office in 11/2022.  last urine micro 7/2022. am cbgs 130s. still exercising.     Her mobility form completed was completed 4/2019 due to OA of knees.  She has appt with dr. Tse 7/18/23     She is obese. has been on adipex in the past but not currently.     She had a mammogram in 8/2021 which was normal and dexa done 8/2022 showed osteopenia. Had colonoscopy with Dr. Cadet 9/2022. had a complete hysterectomy for noncancerous reasons and therefore no longer does pap smears.  She is on bi:est compounded cream.      She is ALLERGIC to sulfa drugs. She drinks alcohol rarely. She does not smoke.  completed covid series and booster. utd on pneumonia shots. completed shingrix series.    Review of Systems   Constitutional: Negative.    HENT: Negative.     Respiratory: Negative.     Cardiovascular: Negative.    Gastrointestinal: Negative.    Genitourinary: Negative.        Review of patient's allergies indicates:   Allergen Reactions    Sulfa (sulfonamide antibiotics) Hives and Itching      Vitals:    01/19/23 1044   BP: 118/82   BP Location: Left arm   Pulse: 82  "  Resp: 16   Temp: 97.7 °F (36.5 °C)   TempSrc: Temporal   SpO2: 98%   Weight: 98.4 kg (216 lb 14.4 oz)   Height: 5' 6" (1.676 m)      Social History     Socioeconomic History    Marital status:    Tobacco Use    Smoking status: Never    Smokeless tobacco: Never   Substance and Sexual Activity    Alcohol use: Never    Drug use: Never    Sexual activity: Yes     Partners: Female     Birth control/protection: None      Family History   Problem Relation Age of Onset    Cancer Mother     Cancer Maternal Grandfather     Cancer Maternal Grandmother     Cancer Paternal Grandfather     Cancer Paternal Grandmother     Cancer Sister     Cancer Daughter           Objective:     Physical Exam  Vitals and nursing note reviewed.   Constitutional:       Appearance: Normal appearance. She is obese.   HENT:      Head: Normocephalic and atraumatic.      Nose: Nose normal.      Mouth/Throat:      Mouth: Mucous membranes are moist.      Pharynx: Oropharynx is clear.   Eyes:      Extraocular Movements: Extraocular movements intact.   Cardiovascular:      Rate and Rhythm: Normal rate and regular rhythm.      Pulses: Normal pulses.           Dorsalis pedis pulses are 2+ on the right side and 2+ on the left side.        Posterior tibial pulses are 2+ on the right side and 2+ on the left side.      Heart sounds: Normal heart sounds.   Pulmonary:      Effort: Pulmonary effort is normal.      Breath sounds: Normal breath sounds.   Musculoskeletal:         General: Normal range of motion.      Cervical back: Normal range of motion.        Feet:    Feet:      Right foot:      Protective Sensation: 8 sites tested.  8 sites sensed.      Skin integrity: Skin integrity normal.      Left foot:      Protective Sensation: 8 sites tested.  8 sites sensed.      Skin integrity: Skin integrity normal.      Comments: Per foot exam 7/2022  Skin:     General: Skin is warm and dry.   Neurological:      General: No focal deficit present.      Mental " Status: She is alert and oriented to person, place, and time. Mental status is at baseline.   Psychiatric:         Mood and Affect: Mood normal.     Current Outpatient Medications on File Prior to Visit   Medication Sig Dispense Refill    amLODIPine (NORVASC) 10 MG tablet TAKE 1 TABLET(10 MG) BY MOUTH 1 TIME EACH DAY      b complex vitamins capsule Take 1 capsule by mouth once daily.      ferrous sulfate (FEOSOL) 325 mg (65 mg iron) Tab tablet Take 325 mg by mouth.      medical supply, miscellaneous (MISCELLANEOUS CREAMS TOP) Apply 0.5 mLs topically every evening. Biest 1.75 mg/ml      niacin 500 mg TbSR       omega-3s/dha/epa/fish oil/D3 (VITAMIN-D + OMEGA-3 ORAL) Take by mouth.      TRULICITY 1.5 mg/0.5 mL pen injector Inject 1.5 mg into the skin every 7 days. 12 pen 3    [DISCONTINUED] losartan (COZAAR) 100 MG tablet Take 1 tablet (100 mg total) by mouth once daily. (Patient taking differently: Take 50 mg by mouth once daily.) 90 tablet 3    [DISCONTINUED] pravastatin (PRAVACHOL) 20 MG tablet Take 1 tablet (20 mg total) by mouth every evening. 90 tablet 3     No current facility-administered medications on file prior to visit.     Health Maintenance   Topic Date Due    Hepatitis C Screening  Never done    Foot Exam  07/12/2022    Hemoglobin A1c  10/20/2022    Eye Exam  11/09/2022    Lipid Panel  07/20/2023    Mammogram  08/17/2023    TETANUS VACCINE  08/01/2024    DEXA Scan  08/17/2025      Results for orders placed or performed in visit on 12/22/22   Immunofixation (ALDA)& Protein Electrophoresis, Urine, 24Hr   Result Value Ref Range    Total Protein, 24 HR, U 80 <229 mg/24 h    Urine Volume 2000 mL    Albumin 28.8 mg/24 h    Alpha-1 globulin 6.4 mg/24 h    Alpha-2 globulin 12.8 mg/24 h    Beta globulin 13.6 mg/24 h    Gamma globulin 17.6 mg/24 h    A/G Ratio 0.57     Impression SEE COMMENTS     Immunofixation, 24 Hr, U SEE COMMENTS     Immunofixation Flag, 24 Hr, U Negative Negative    Collection Duration 24 h           Assessment & Plan:     Active Problem List with Overview Notes    Diagnosis Date Noted    Hypertension associated with diabetes 01/19/2023    Postmenopausal 07/21/2022    Breast cancer screening by mammogram 07/21/2022    Vitamin D deficiency 07/21/2022    Type 2 diabetes mellitus 07/21/2022    Advanced care planning/counseling discussion 07/21/2022    Osteoarthritis of knee 07/21/2022    Gout 07/21/2022    Colon cancer screening 07/21/2022    Severe obesity (BMI 35.0-39.9) with comorbidity     Benign hypertensive kidney disease with chronic kidney disease 09/03/2021     Last Assessment & Plan:   Formatting of this note might be different from the original.  BP significantly improved.  Continue current regimen.  Continue home BP monitoring.      Hyperlipidemia associated with type 2 diabetes mellitus 09/03/2021    Stage 3b chronic kidney disease 09/03/2021     Last Assessment & Plan:   Formatting of this note might be different from the original.  Renal indices remain stable at baseline.         1. Type 2 diabetes mellitus with hyperglycemia, without long-term current use of insulin  Assessment & Plan:  Lab Results   Component Value Date    HGBA1C 6.8 07/20/2022     Urine micro 7/2022  Foot exam 7/2022  Eye exam with dr. Murrieta 11/2022. Need to request    Will get updated a1c. Order in. Will call with results when available    On statin and arb. On trulicity. Well controlled.     Orders:  -     Hemoglobin A1C; Future; Expected date: 01/19/2023    2. Benign essential hypertension    3. Hypertension associated with diabetes  Assessment & Plan:  cozar decreased to 50mg daily per renal.  Continue to monitor bp. Contact clinic for concerns. Patient is agreeable to plan and verbalized understanding      4. Hyperlipidemia associated with type 2 diabetes mellitus  Assessment & Plan:  On pravastatin 20mg.  Continue on niacin and fish oil. Did not tolerate zetia      5. Stage 3b chronic kidney disease  Overview:  Last  Assessment & Plan:   Formatting of this note might be different from the original.  Renal indices remain stable at baseline.    Assessment & Plan:  Keep appts with renal.  Decreased her calcium and losartan at lov. Encourage fluids.       6. Severe obesity (BMI 35.0-39.9) with comorbidity  Assessment & Plan:  Encourage lifestyle change      Other orders  -     pravastatin (PRAVACHOL) 20 MG tablet; Take 1 tablet (20 mg total) by mouth every evening.  Dispense: 90 tablet; Refill: 3  -     losartan (COZAAR) 50 MG tablet; Take 1 tablet (50 mg total) by mouth once daily.  Dispense: 90 tablet; Refill: 3         No follow-ups on file.

## 2023-01-19 NOTE — ASSESSMENT & PLAN NOTE
Lab Results   Component Value Date    HGBA1C 6.8 07/20/2022     Urine micro 7/2022  Foot exam 7/2022  Eye exam with dr. Murrieta 11/2022. Need to request    Will get updated a1c. Order in. Will call with results when available    On statin and arb. On trulicity. Well controlled.

## 2023-01-19 NOTE — ASSESSMENT & PLAN NOTE
cozar decreased to 50mg daily per renal.  Continue to monitor bp. Contact clinic for concerns. Patient is agreeable to plan and verbalized understanding

## 2023-01-19 NOTE — TELEPHONE ENCOUNTER
Patient is on bi:est cream from [facility]. Epic will not let me refill.  Can we reach out to [facility] to have them fax a refill request for this med please? thanks

## 2023-06-23 ENCOUNTER — LAB VISIT (OUTPATIENT)
Dept: LAB | Facility: HOSPITAL | Age: 68
End: 2023-06-23
Payer: MEDICARE

## 2023-06-23 DIAGNOSIS — N18.32 CHRONIC RENAL FAILURE (CRF), STAGE 3B: ICD-10-CM

## 2023-06-23 DIAGNOSIS — I12.9 PARENCHYMAL RENAL HYPERTENSION: Primary | ICD-10-CM

## 2023-06-23 DIAGNOSIS — R80.9 PROTEINURIA, UNSPECIFIED TYPE: ICD-10-CM

## 2023-06-23 DIAGNOSIS — E55.9 VITAMIN D DEFICIENCY: ICD-10-CM

## 2023-06-23 LAB
ALBUMIN SERPL-MCNC: 3.9 G/DL (ref 3.4–4.8)
ALBUMIN/GLOB SERPL: 1.1 RATIO (ref 1.1–2)
ALP SERPL-CCNC: 64 UNIT/L (ref 40–150)
ALT SERPL-CCNC: 15 UNIT/L (ref 0–55)
AST SERPL-CCNC: 14 UNIT/L (ref 5–34)
BASOPHILS # BLD AUTO: 0.04 X10(3)/MCL
BASOPHILS NFR BLD AUTO: 0.6 %
BILIRUBIN DIRECT+TOT PNL SERPL-MCNC: 0.7 MG/DL
BUN SERPL-MCNC: 32.9 MG/DL (ref 9.8–20.1)
CALCIUM SERPL-MCNC: 10.2 MG/DL (ref 8.4–10.2)
CHLORIDE SERPL-SCNC: 102 MMOL/L (ref 98–107)
CO2 SERPL-SCNC: 26 MMOL/L (ref 23–31)
CREAT SERPL-MCNC: 1.62 MG/DL (ref 0.55–1.02)
CREAT UR-MCNC: 89.2 MG/DL (ref 47–110)
DEPRECATED CALCIDIOL+CALCIFEROL SERPL-MC: 47.5 NG/ML (ref 30–80)
EOSINOPHIL # BLD AUTO: 0.07 X10(3)/MCL (ref 0–0.9)
EOSINOPHIL NFR BLD AUTO: 1 %
ERYTHROCYTE [DISTWIDTH] IN BLOOD BY AUTOMATED COUNT: 13 % (ref 11.5–17)
GFR SERPLBLD CREATININE-BSD FMLA CKD-EPI: 34 MLS/MIN/1.73/M2
GLOBULIN SER-MCNC: 3.5 GM/DL (ref 2.4–3.5)
GLUCOSE SERPL-MCNC: 179 MG/DL (ref 82–115)
HCT VFR BLD AUTO: 37.2 % (ref 37–47)
HGB BLD-MCNC: 12 G/DL (ref 12–16)
IMM GRANULOCYTES # BLD AUTO: 0.01 X10(3)/MCL (ref 0–0.04)
IMM GRANULOCYTES NFR BLD AUTO: 0.1 %
LYMPHOCYTES # BLD AUTO: 1.4 X10(3)/MCL (ref 0.6–4.6)
LYMPHOCYTES NFR BLD AUTO: 19.7 %
MAGNESIUM SERPL-MCNC: 1.8 MG/DL (ref 1.6–2.6)
MCH RBC QN AUTO: 30.9 PG (ref 27–31)
MCHC RBC AUTO-ENTMCNC: 32.3 G/DL (ref 33–36)
MCV RBC AUTO: 95.9 FL (ref 80–94)
MONOCYTES # BLD AUTO: 0.47 X10(3)/MCL (ref 0.1–1.3)
MONOCYTES NFR BLD AUTO: 6.6 %
NEUTROPHILS # BLD AUTO: 5.12 X10(3)/MCL (ref 2.1–9.2)
NEUTROPHILS NFR BLD AUTO: 72 %
NRBC BLD AUTO-RTO: 0 %
PHOSPHATE SERPL-MCNC: 3.6 MG/DL (ref 2.3–4.7)
PLATELET # BLD AUTO: 232 X10(3)/MCL (ref 130–400)
PMV BLD AUTO: 10.1 FL (ref 7.4–10.4)
POTASSIUM SERPL-SCNC: 4 MMOL/L (ref 3.5–5.1)
PROT SERPL-MCNC: 7.4 GM/DL (ref 5.8–7.6)
PROT UR STRIP-MCNC: <6.8 MG/DL
PTH-INTACT SERPL-MCNC: 43.9 PG/ML (ref 8.7–77)
RBC # BLD AUTO: 3.88 X10(6)/MCL (ref 4.2–5.4)
SODIUM SERPL-SCNC: 137 MMOL/L (ref 136–145)
WBC # SPEC AUTO: 7.11 X10(3)/MCL (ref 4.5–11.5)

## 2023-06-23 PROCEDURE — 82570 ASSAY OF URINE CREATININE: CPT

## 2023-06-23 PROCEDURE — 83970 ASSAY OF PARATHORMONE: CPT

## 2023-06-23 PROCEDURE — 80053 COMPREHEN METABOLIC PANEL: CPT

## 2023-06-23 PROCEDURE — 83735 ASSAY OF MAGNESIUM: CPT

## 2023-06-23 PROCEDURE — 36415 COLL VENOUS BLD VENIPUNCTURE: CPT

## 2023-06-23 PROCEDURE — 82306 VITAMIN D 25 HYDROXY: CPT

## 2023-06-23 PROCEDURE — 84100 ASSAY OF PHOSPHORUS: CPT

## 2023-06-23 PROCEDURE — 85025 COMPLETE CBC W/AUTO DIFF WBC: CPT

## 2023-07-10 RX ORDER — DULAGLUTIDE 1.5 MG/.5ML
1.5 INJECTION, SOLUTION SUBCUTANEOUS
Qty: 12 PEN | Refills: 3 | Status: SHIPPED | OUTPATIENT
Start: 2023-07-10 | End: 2023-07-25 | Stop reason: SDUPTHER

## 2023-07-10 NOTE — TELEPHONE ENCOUNTER
----- Message from Cindy Vo sent at 7/7/2023  1:19 PM CDT -----  Regarding: med refill  Type:  RX Refill Request    Who Called: patient  Refill or New Rx:refill  RX Name and Strength: TRULICITY 1.5 mg/0.5 mL pen injector  How is the patient currently taking it? (ex. 1XDay):  Is this a 30 day or 90 day RX:  Preferred Pharmacy with phone number:[facility]  Local or Mail Order:local  Ordering Provider:Kyleigh  Would the patient rather a call back or a response via [facility]?   Best Call Back Number:[phone]  Additional Information: please call patient back to confirm

## 2023-07-19 ENCOUNTER — TELEPHONE (OUTPATIENT)
Dept: FAMILY MEDICINE | Facility: CLINIC | Age: 68
End: 2023-07-19
Payer: MEDICARE

## 2023-07-19 DIAGNOSIS — E78.5 HYPERLIPIDEMIA ASSOCIATED WITH TYPE 2 DIABETES MELLITUS: ICD-10-CM

## 2023-07-19 DIAGNOSIS — E11.65 TYPE 2 DIABETES MELLITUS WITH HYPERGLYCEMIA, WITHOUT LONG-TERM CURRENT USE OF INSULIN: ICD-10-CM

## 2023-07-19 DIAGNOSIS — Z00.00 MEDICARE ANNUAL WELLNESS VISIT, SUBSEQUENT: Primary | ICD-10-CM

## 2023-07-19 DIAGNOSIS — E55.9 VITAMIN D DEFICIENCY: ICD-10-CM

## 2023-07-19 DIAGNOSIS — E11.59 HYPERTENSION ASSOCIATED WITH DIABETES: ICD-10-CM

## 2023-07-19 DIAGNOSIS — E11.69 HYPERLIPIDEMIA ASSOCIATED WITH TYPE 2 DIABETES MELLITUS: ICD-10-CM

## 2023-07-19 DIAGNOSIS — I15.2 HYPERTENSION ASSOCIATED WITH DIABETES: ICD-10-CM

## 2023-07-24 ENCOUNTER — LAB VISIT (OUTPATIENT)
Dept: LAB | Facility: HOSPITAL | Age: 68
End: 2023-07-24
Attending: FAMILY MEDICINE
Payer: MEDICARE

## 2023-07-24 DIAGNOSIS — E11.65 TYPE 2 DIABETES MELLITUS WITH HYPERGLYCEMIA, WITHOUT LONG-TERM CURRENT USE OF INSULIN: ICD-10-CM

## 2023-07-24 DIAGNOSIS — E11.59 HYPERTENSION ASSOCIATED WITH DIABETES: ICD-10-CM

## 2023-07-24 DIAGNOSIS — E55.9 VITAMIN D DEFICIENCY: ICD-10-CM

## 2023-07-24 DIAGNOSIS — I15.2 HYPERTENSION ASSOCIATED WITH DIABETES: ICD-10-CM

## 2023-07-24 DIAGNOSIS — E11.69 HYPERLIPIDEMIA ASSOCIATED WITH TYPE 2 DIABETES MELLITUS: ICD-10-CM

## 2023-07-24 DIAGNOSIS — E78.5 HYPERLIPIDEMIA ASSOCIATED WITH TYPE 2 DIABETES MELLITUS: ICD-10-CM

## 2023-07-24 DIAGNOSIS — Z00.00 MEDICARE ANNUAL WELLNESS VISIT, SUBSEQUENT: ICD-10-CM

## 2023-07-24 LAB
ALBUMIN SERPL-MCNC: 4.1 G/DL (ref 3.4–4.8)
ALBUMIN/GLOB SERPL: 1.2 RATIO (ref 1.1–2)
ALP SERPL-CCNC: 61 UNIT/L (ref 40–150)
ALT SERPL-CCNC: 18 UNIT/L (ref 0–55)
AST SERPL-CCNC: 14 UNIT/L (ref 5–34)
BASOPHILS # BLD AUTO: 0.05 X10(3)/MCL
BASOPHILS NFR BLD AUTO: 0.9 %
BILIRUBIN DIRECT+TOT PNL SERPL-MCNC: 0.5 MG/DL
BUN SERPL-MCNC: 26.6 MG/DL (ref 9.8–20.1)
CALCIUM SERPL-MCNC: 9.9 MG/DL (ref 8.4–10.2)
CHLORIDE SERPL-SCNC: 101 MMOL/L (ref 98–107)
CHOLEST SERPL-MCNC: 221 MG/DL
CHOLEST/HDLC SERPL: 5 {RATIO} (ref 0–5)
CO2 SERPL-SCNC: 28 MMOL/L (ref 23–31)
CREAT SERPL-MCNC: 1.52 MG/DL (ref 0.55–1.02)
CREAT UR-MCNC: 179.5 MG/DL (ref 45–106)
DEPRECATED CALCIDIOL+CALCIFEROL SERPL-MC: 50.4 NG/ML (ref 30–80)
EOSINOPHIL # BLD AUTO: 0.08 X10(3)/MCL (ref 0–0.9)
EOSINOPHIL NFR BLD AUTO: 1.4 %
ERYTHROCYTE [DISTWIDTH] IN BLOOD BY AUTOMATED COUNT: 12.7 % (ref 11.5–17)
EST. AVERAGE GLUCOSE BLD GHB EST-MCNC: 134.1 MG/DL
GFR SERPLBLD CREATININE-BSD FMLA CKD-EPI: 37 MLS/MIN/1.73/M2
GLOBULIN SER-MCNC: 3.5 GM/DL (ref 2.4–3.5)
GLUCOSE SERPL-MCNC: 156 MG/DL (ref 82–115)
HBA1C MFR BLD: 6.3 %
HCT VFR BLD AUTO: 36.7 % (ref 37–47)
HCV AB SERPL QL IA: NONREACTIVE
HDLC SERPL-MCNC: 49 MG/DL (ref 35–60)
HGB BLD-MCNC: 12.3 G/DL (ref 12–16)
IMM GRANULOCYTES # BLD AUTO: 0.02 X10(3)/MCL (ref 0–0.04)
IMM GRANULOCYTES NFR BLD AUTO: 0.3 %
LDLC SERPL CALC-MCNC: 153 MG/DL (ref 50–140)
LYMPHOCYTES # BLD AUTO: 1.32 X10(3)/MCL (ref 0.6–4.6)
LYMPHOCYTES NFR BLD AUTO: 22.4 %
MCH RBC QN AUTO: 30.8 PG (ref 27–31)
MCHC RBC AUTO-ENTMCNC: 33.5 G/DL (ref 33–36)
MCV RBC AUTO: 91.8 FL (ref 80–94)
MICROALBUMIN UR-MCNC: 13 UG/ML
MICROALBUMIN/CREAT RATIO PNL UR: 7.2 MG/GM CR (ref 0–30)
MONOCYTES # BLD AUTO: 0.42 X10(3)/MCL (ref 0.1–1.3)
MONOCYTES NFR BLD AUTO: 7.1 %
NEUTROPHILS # BLD AUTO: 3.99 X10(3)/MCL (ref 2.1–9.2)
NEUTROPHILS NFR BLD AUTO: 67.9 %
NRBC BLD AUTO-RTO: 0 %
PLATELET # BLD AUTO: 247 X10(3)/MCL (ref 130–400)
PMV BLD AUTO: 10.5 FL (ref 7.4–10.4)
POTASSIUM SERPL-SCNC: 4 MMOL/L (ref 3.5–5.1)
PROT SERPL-MCNC: 7.6 GM/DL (ref 5.8–7.6)
RBC # BLD AUTO: 4 X10(6)/MCL (ref 4.2–5.4)
SODIUM SERPL-SCNC: 137 MMOL/L (ref 136–145)
TRIGL SERPL-MCNC: 94 MG/DL (ref 37–140)
TSH SERPL-ACNC: 0.87 UIU/ML (ref 0.35–4.94)
VLDLC SERPL CALC-MCNC: 19 MG/DL
WBC # SPEC AUTO: 5.88 X10(3)/MCL (ref 4.5–11.5)

## 2023-07-24 PROCEDURE — 80053 COMPREHEN METABOLIC PANEL: CPT

## 2023-07-24 PROCEDURE — 84443 ASSAY THYROID STIM HORMONE: CPT

## 2023-07-24 PROCEDURE — 36415 COLL VENOUS BLD VENIPUNCTURE: CPT

## 2023-07-24 PROCEDURE — 82043 UR ALBUMIN QUANTITATIVE: CPT

## 2023-07-24 PROCEDURE — 83036 HEMOGLOBIN GLYCOSYLATED A1C: CPT

## 2023-07-24 PROCEDURE — 80061 LIPID PANEL: CPT

## 2023-07-24 PROCEDURE — 82306 VITAMIN D 25 HYDROXY: CPT

## 2023-07-24 PROCEDURE — 85025 COMPLETE CBC W/AUTO DIFF WBC: CPT

## 2023-07-24 PROCEDURE — 86803 HEPATITIS C AB TEST: CPT

## 2023-07-25 ENCOUNTER — OFFICE VISIT (OUTPATIENT)
Dept: FAMILY MEDICINE | Facility: CLINIC | Age: 68
End: 2023-07-25
Payer: MEDICARE

## 2023-07-25 ENCOUNTER — TELEPHONE (OUTPATIENT)
Dept: FAMILY MEDICINE | Facility: CLINIC | Age: 68
End: 2023-07-25

## 2023-07-25 VITALS
RESPIRATION RATE: 16 BRPM | BODY MASS INDEX: 35.58 KG/M2 | OXYGEN SATURATION: 98 % | DIASTOLIC BLOOD PRESSURE: 70 MMHG | TEMPERATURE: 98 F | HEIGHT: 66 IN | HEART RATE: 68 BPM | WEIGHT: 221.38 LBS | SYSTOLIC BLOOD PRESSURE: 128 MMHG

## 2023-07-25 DIAGNOSIS — I12.9 BENIGN HYPERTENSIVE KIDNEY DISEASE WITH CHRONIC KIDNEY DISEASE STAGE I THROUGH STAGE IV, OR UNSPECIFIED: ICD-10-CM

## 2023-07-25 DIAGNOSIS — E78.5 HYPERLIPIDEMIA ASSOCIATED WITH TYPE 2 DIABETES MELLITUS: ICD-10-CM

## 2023-07-25 DIAGNOSIS — I15.2 HYPERTENSION ASSOCIATED WITH DIABETES: ICD-10-CM

## 2023-07-25 DIAGNOSIS — N18.32 STAGE 3B CHRONIC KIDNEY DISEASE: ICD-10-CM

## 2023-07-25 DIAGNOSIS — E66.01 SEVERE OBESITY (BMI 35.0-39.9) WITH COMORBIDITY: ICD-10-CM

## 2023-07-25 DIAGNOSIS — Z12.11 COLON CANCER SCREENING: ICD-10-CM

## 2023-07-25 DIAGNOSIS — E11.59 HYPERTENSION ASSOCIATED WITH DIABETES: ICD-10-CM

## 2023-07-25 DIAGNOSIS — Z71.89 ADVANCED CARE PLANNING/COUNSELING DISCUSSION: ICD-10-CM

## 2023-07-25 DIAGNOSIS — Z78.0 POSTMENOPAUSAL: ICD-10-CM

## 2023-07-25 DIAGNOSIS — E55.9 VITAMIN D DEFICIENCY: ICD-10-CM

## 2023-07-25 DIAGNOSIS — Z00.00 MEDICARE ANNUAL WELLNESS VISIT, SUBSEQUENT: Primary | ICD-10-CM

## 2023-07-25 DIAGNOSIS — Z12.31 BREAST CANCER SCREENING BY MAMMOGRAM: ICD-10-CM

## 2023-07-25 DIAGNOSIS — E11.69 HYPERLIPIDEMIA ASSOCIATED WITH TYPE 2 DIABETES MELLITUS: ICD-10-CM

## 2023-07-25 DIAGNOSIS — E11.65 TYPE 2 DIABETES MELLITUS WITH HYPERGLYCEMIA, WITHOUT LONG-TERM CURRENT USE OF INSULIN: ICD-10-CM

## 2023-07-25 PROCEDURE — G0439 PPPS, SUBSEQ VISIT: HCPCS | Mod: ,,, | Performed by: FAMILY MEDICINE

## 2023-07-25 PROCEDURE — G0439 PR MEDICARE ANNUAL WELLNESS SUBSEQUENT VISIT: ICD-10-PCS | Mod: ,,, | Performed by: FAMILY MEDICINE

## 2023-07-25 RX ORDER — DULAGLUTIDE 1.5 MG/.5ML
1.5 INJECTION, SOLUTION SUBCUTANEOUS
Qty: 12 PEN | Refills: 3 | Status: SHIPPED | OUTPATIENT
Start: 2023-07-25 | End: 2024-01-08

## 2023-07-25 RX ORDER — AMLODIPINE BESYLATE 10 MG/1
10 TABLET ORAL DAILY
Qty: 90 TABLET | Refills: 3 | Status: SHIPPED | OUTPATIENT
Start: 2023-07-25 | End: 2024-03-20 | Stop reason: SDUPTHER

## 2023-07-25 RX ORDER — PRAVASTATIN SODIUM 40 MG/1
40 TABLET ORAL NIGHTLY
Qty: 90 TABLET | Refills: 3 | Status: SHIPPED | OUTPATIENT
Start: 2023-07-25 | End: 2024-07-24

## 2023-07-25 RX ORDER — LOSARTAN POTASSIUM 50 MG/1
50 TABLET ORAL DAILY
Qty: 90 TABLET | Refills: 3 | Status: SHIPPED | OUTPATIENT
Start: 2023-07-25 | End: 2024-01-17 | Stop reason: SDUPTHER

## 2023-07-25 NOTE — TELEPHONE ENCOUNTER
Unable to renew bi:est cream from [place].  Please call pharmacy and ask them to send us a refill request on behalf of patient so we can renew for her.

## 2023-07-25 NOTE — PROGRESS NOTES
Patient ID: [id number]     Chief Complaint: Medicare AWV (Wellness )      HPI:     Shakila Velazquez is a 67 y.o. female here today for a Medicare Wellness. No other complaints today.     Very pleasant patient presents to clinic unaccompanied for her wellness visit.  She did labs prior to her appt and it was reviewed with her at time of appt today     Wellness labs 7/2022 showed concern for gout.  Will work on dietary changes prior to starting meds.  No issues of late     Has CKD.  seeing renal- [facility]- kamron corey NP.  Drinking more water.  Saw her 4/2022. started on oral iron 2x/week.  They had her decrease her calcium and losartan dosing by 1/2. Has follow up tomorrow.       she has htn and hld and is on norvasc and losartan. on asa. zetia did not work. started niacin mid 8/2021. Tolerating pravastatin 20mg daily.      she has diabetes. she is on trulicity 1.5mg weekly. last a1c was 6.3 7/2023. Foot exam 1/2023. eye exams with dr. castrejon office 11/2022.  last urine micro 7/2023. am cbgs 130s. still exercising.     Her mobility form completed was completed 4/2019 due to OA of knees.  She has appt with dr. Tse 7/18/23     She is obese. has been on adipex in the past but not currently.     She had a mammogram in 8/2022 which was normal and dexa done 8/2022 showed osteopenia. Had colonoscopy with Dr. Cadet 9/2022. had a complete hysterectomy for noncancerous reasons and therefore no longer does pap smears.  She is on bi:est compounded cream. Needs refill from [facility].      She is ALLERGIC to sulfa drugs. She drinks alcohol rarely. She does not smoke.  completed covid series and booster. utd on pneumonia shots. completed shingrix series.          Past Surgical History:   Procedure Laterality Date    HYSTERECTOMY  1986    TUBAL LIGATION  1985       Review of patient's allergies indicates:   Allergen Reactions    Sulfa (sulfonamide antibiotics) Hives and Itching       Outpatient Medications Marked as  Taking for the 7/25/23 encounter (Office Visit) with Regi Arizmendi MD   Medication Sig Dispense Refill    aspirin (ECOTRIN) 81 MG EC tablet Take 81 mg by mouth once daily.         Social History     Socioeconomic History    Marital status:    Tobacco Use    Smoking status: Never    Smokeless tobacco: Never   Substance and Sexual Activity    Alcohol use: Never    Drug use: Never    Sexual activity: Yes     Partners: Female     Birth control/protection: None        Family History   Problem Relation Age of Onset    Cancer Mother     Cancer Maternal Grandfather     Cancer Maternal Grandmother     Cancer Paternal Grandfather     Cancer Paternal Grandmother     Cancer Sister     Cancer Daughter     Diabetes Paternal Aunt         Patient Care Team:  Regi Arizmendi MD as PCP - General (Family Medicine)  Maurice Cadet MD as Consulting Physician (Gastroenterology)  Juan José Murrieta MD as Consulting Physician (Ophthalmology)  CASSIA Espinal as Nurse Practitioner (Nurse Practitioner)  Denita Tse MD as Consulting Physician (Rheumatology)       Subjective:     Review of Systems   Constitutional: Negative.    HENT: Negative.     Eyes: Negative.    Respiratory: Negative.     Cardiovascular: Negative.    Gastrointestinal: Negative.    Genitourinary: Negative.    Musculoskeletal: Negative.    Skin: Negative.    Neurological: Negative.    Endo/Heme/Allergies: Negative.    Psychiatric/Behavioral: Negative.         Patient Reported Health Risk Assessment  What [age] 65-69  Are you male or female?: Female  During the past four weeks, how much have you been bothered by emotional problems such as feeling anxious, depressed, irritable, sad, or downhearted and blue?: Not at all  During the past five weeks, has your physical and/or emotional health limited your social activities with family, friends, neighbors, or groups?: Not at all  During the past four weeks, how much bodily pain have you generally had?:  No pain  During the past four weeks, was someone available to help if you needed and wanted help?: Yes, as much as I wanted  During the past four weeks, what was the hardest physical activity you could do for at least two minutes?: Light  Can you get to places out of walking distance without help?  (For example, can you travel alone on buses or taxis, or drive your own car?): Yes  Can you go shopping for groceries or clothes without someone's help?: Yes  Can you prepare your own meals?: Yes  Can you do your own housework without help?: Yes  Because of any health problems, do you need the help of another person with your personal care needs such as eating, bathing, dressing, or getting around the house?: No  Can you handle your own money without help?: Yes  During the past four weeks, how would you rate your health in general?: Very good  How have things been going for you during the past four weeks?: Good and bad parts about equal  Are you having difficulties driving your car?: No  Do you always fasten your seat belt when you are in a car?: Yes, usually  How often in the past four weeks have you been bothered by falling or dizzy when standing up?: Never  How often in the past four weeks have you been bothered by sexual problems?: Never  How often in the past four weeks have you been bothered by trouble eating well?: Never  How often in the past four weeks have you been bothered by teeth or denture problems?: Never  How often in the past four weeks have you been bothered with problems using the telephone?: Never  How often in the past four weeks have you been bothered by tiredness or fatigue?: Never  Have you fallen two or more times in the past year?: No  Are you afraid of falling?: No  Are you a smoker?: No  During the past four weeks, how many drinks of wine, beer, or other alcoholic beverages did you have?: No alcohol at all  Do you exercise for about 20 minutes three or more days a week?: Yes, most of the  "time  Have you been given any information to help you with hazards in your house that might hurt you?: No  Have you been given any information to help you with keeping track of your medications?: No  How often do you have trouble taking medicines the way you've been told to take them?: I always take them as prescribed  How confident are you that you can control and manage most of your health problems?: Very confident  What is your race? (Check all that apply.):     Objective:     /70 (BP Location: Left arm)   Pulse 68   Temp 98.3 °F (36.8 °C) (Temporal)   Resp 16   Ht 5' 6" (1.676 m)   Wt 100.4 kg (221 lb 6.4 oz)   LMP  (LMP Unknown)   SpO2 98%   BMI 35.73 kg/m²     Physical Exam  Vitals and nursing note reviewed.   Constitutional:       Appearance: Normal appearance. She is obese.   HENT:      Head: Normocephalic and atraumatic.      Nose: Nose normal.      Mouth/Throat:      Mouth: Mucous membranes are moist.      Pharynx: Oropharynx is clear.   Eyes:      Extraocular Movements: Extraocular movements intact.   Cardiovascular:      Rate and Rhythm: Normal rate and regular rhythm.      Pulses: Normal pulses.      Heart sounds: Normal heart sounds.   Pulmonary:      Effort: Pulmonary effort is normal.      Breath sounds: Normal breath sounds.   Musculoskeletal:         General: Normal range of motion.      Cervical back: Normal range of motion.   Skin:     General: Skin is warm and dry.   Neurological:      General: No focal deficit present.      Mental Status: She is alert and oriented to person, place, and time. Mental status is at baseline.   Psychiatric:         Mood and Affect: Mood normal.         No flowsheet data found.  Fall Risk Assessment - Outpatient 7/25/2023 1/19/2023 7/21/2022   Mobility Status Ambulatory Ambulatory Ambulatory   Number of falls 0 0 0   Identified as fall risk 0 0 0           Depression Screening  Over the past two weeks, has the patient felt down, " depressed, or hopeless?: No  Over the past two weeks, has the patient felt little interest or pleasure in doing things?: No  Functional Ability/Safety Screening  Was the patient's timed Up & Go test unsteady or longer than 30 seconds?: No  Does the patient need help with phone, transportation, shopping, preparing meals, housework, laundry, meds, or managing money?: No  Does the patient's home have rugs in the hallway, lack grab bars in the bathroom, lack handrails on the stairs or have poor lighting?: No  Have you noticed any hearing difficulties?: No  Cognitive Function (Assessed through direct observation with due consideration of information obtained by way of patient reports and/or concerns raised by family, friends, caretakers, or others)    Does the patient repeat questions/statements in the same day?: No  Does the patient have trouble remembering the date, year, and time?: No  Does the patient have difficulty managing finances?: No  Does the patient have a decreased sense of direction?: No  Assessment/Plan:       Medicare Annual Wellness and Personalized Prevention Plan:   Fall Risk + Home Safety + Hearing Impairment + Depression Screen + Cognitive Impairment Screen + Health Risk Assessment all reviewed.     Opioid Screening: Patient medication list reviewed, patient is not taking prescription opioids. Patient is not using additional opioids than prescribed. Patient is at low risk of substance abuse based on this opioid use history.         Health Maintenance Topics with due status: Not Due       Topic Last Completion Date    TETANUS VACCINE 08/01/2014    DEXA Scan 08/17/2022    Colorectal Cancer Screening 09/21/2022    Influenza Vaccine 09/28/2022    Eye Exam 11/15/2022    Foot Exam 01/19/2023    Lipid Panel 07/24/2023    Hemoglobin A1c 07/24/2023      The patient's Health Maintenance was reviewed and the following appears to be due at this time:   Health Maintenance Due   Topic Date Due    COVID-19 Vaccine  (6 - Moderna series) 12/14/2022    Mammogram  08/17/2023         1. Medicare annual wellness visit, subsequent  Assessment & Plan:  Previously done labs reviewed at time of visit today  mmg 8/2022. Ordered  dexa 8/2022 showed osteopenia.   Colonoscopy 9/2022 with dr. bhandari   No longer does pap smear due to complete hyst for noncancerous reasons    Advanced care planning discussed and paperwork given.      2. Advanced care planning/counseling discussion  Assessment & Plan:  Advanced care planning discussed and paperwork given.    I attest that I have had a face to face discussion with patient and or surrogate decision maker.   Included surrogate decision maker: NO  Advanced directive in chart: NO  LAPOST: NO    Total time spent: 16 minutes        3. Type 2 diabetes mellitus with hyperglycemia, without long-term current use of insulin  Assessment & Plan:  Lab Results   Component Value Date    HGBA1C 6.3 07/24/2023     Urine micro 7/2023  Foot exam 1/2023  Eye exam with dr. Murrieta       On statin and arb. On trulicity. Well controlled.     Orders:  -     TRULICITY 1.5 mg/0.5 mL pen injector; Inject 1.5 mg into the skin every 7 days.  Dispense: 12 pen ; Refill: 3    4. Hypertension associated with diabetes  Assessment & Plan:  Well controlled on current prescriptions.   Continue to monitor bp. Contact clinic for concerns. Patient is agreeable to plan and verbalized understanding    Orders:  -     amLODIPine (NORVASC) 10 MG tablet; Take 1 tablet (10 mg total) by mouth once daily.  Dispense: 90 tablet; Refill: 3  -     losartan (COZAAR) 50 MG tablet; Take 1 tablet (50 mg total) by mouth once daily.  Dispense: 90 tablet; Refill: 3    5. Hyperlipidemia associated with type 2 diabetes mellitus  Assessment & Plan:  On pravastatin 20mg. Due to recent labs not at goal.  Will increase to 40mg. New rx sent in. Continue on niacin and fish oil. Did not tolerate zetia    Lab Results   Component Value Date    CHOL 221 (H) 07/24/2023     CHOL 235 (H) 07/20/2022    CHOL 248 03/08/2022     Lab Results   Component Value Date    HDL 49 07/24/2023    HDL 44 07/20/2022    HDL 42 03/08/2022     No results found for: LDLCALC  No results found for: DLDL  Lab Results   Component Value Date    TRIG 94 07/24/2023    TRIG 132 07/20/2022    TRIG 214 03/08/2022       f1 No results found for: CHOLHDL      Orders:  -     pravastatin (PRAVACHOL) 40 MG tablet; Take 1 tablet (40 mg total) by mouth every evening.  Dispense: 90 tablet; Refill: 3    6. Stage 3b chronic kidney disease  Overview:  Last Assessment & Plan:   Formatting of this note might be different from the original.  Renal indices remain stable at baseline.    Assessment & Plan:  Keep scheduled appointments with renal. Encourage fluids. On oral iron      7. Benign hypertensive kidney disease with chronic kidney disease stage I through stage IV, or unspecified  Overview:  Last Assessment & Plan:   Formatting of this note might be different from the original.  BP significantly improved.  Continue current regimen.  Continue home BP monitoring.    Assessment & Plan:  Keep scheduled appointments with renal. Encourage fluids. On oral iron      8. Breast cancer screening by mammogram  Assessment & Plan:  mmg 8/2022. ordered    Orders:  -     Mammo Digital Screening Bilat w/ Reym[facility]; Future; Expected date: 08/25/2023    9. Postmenopausal  Assessment & Plan:  dexa 8/2022 showed osteopenia. On calcium and vit d. Encouraged weight bearing exercises.      10. Vitamin D deficiency  Assessment & Plan:  Cont to supplement otc      11. Severe obesity (BMI 35.0-39.9) with comorbidity  Assessment & Plan:  Encouraged lifestyle change      12. Colon cancer screening  Assessment & Plan:  Colonoscopy with dr. bhandari 9/2022           Advance Care Planning   I attest to discussing Advance Care Planning with patient and/or family member.  Education was provided including the importance of the Health Care Power of , Advance  Directives, and/or [place] documentation.  The patient expressed understanding to the importance of this information and discussion.  Length of ACP conversation in minutes: 16         Medication List with Changes/Refills   Current Medications    ASPIRIN (ECOTRIN) 81 MG EC TABLET    Take 81 mg by mouth once daily.       Start Date: --        End Date: --    B COMPLEX VITAMINS CAPSULE    Take 1 capsule by mouth once daily.       Start Date: --        End Date: --    FERROUS SULFATE (FEOSOL) 325 MG (65 MG IRON) TAB TABLET    Take 325 mg by mouth.       Start Date: --        End Date: --    MEDICAL SUPPLY, MISCELLANEOUS (MISCELLANEOUS CREAMS TOP)    Apply 0.5 mLs topically every evening. Biest 1.75 mg/ml       Start Date: 7/14/2022 End Date: --    NIACIN 500 MG TBSR           Start Date: --        End Date: --    OMEGA-3S/DHA/EPA/FISH OIL/D3 (VITAMIN-D + OMEGA-3 ORAL)    Take by mouth.       Start Date: --        End Date: --   Changed and/or Refilled Medications    Modified Medication Previous Medication    AMLODIPINE (NORVASC) 10 MG TABLET amLODIPine (NORVASC) 10 MG tablet       Take 1 tablet (10 mg total) by mouth once daily.    TAKE 1 TABLET(10 MG) BY MOUTH 1 TIME EACH DAY       Start Date: 7/25/2023 End Date: 7/24/2024    Start Date: 11/8/2021 End Date: 7/25/2023    LOSARTAN (COZAAR) 50 MG TABLET losartan (COZAAR) 50 MG tablet       Take 1 tablet (50 mg total) by mouth once daily.    Take 1 tablet (50 mg total) by mouth once daily.       Start Date: 7/25/2023 End Date: 7/24/2024    Start Date: 1/19/2023 End Date: 7/25/2023    PRAVASTATIN (PRAVACHOL) 40 MG TABLET pravastatin (PRAVACHOL) 20 MG tablet       Take 1 tablet (40 mg total) by mouth every evening.    Take 1 tablet (20 mg total) by mouth every evening.       Start Date: 7/25/2023 End Date: 7/24/2024    Start Date: 1/19/2023 End Date: 7/25/2023    TRULICITY 1.5 MG/0.5 ML PEN INJECTOR TRULICITY 1.5 mg/0.5 mL pen injector       Inject 1.5 mg into the skin every  7 days.    Inject 1.5 mg into the skin every 7 days.       Start Date: 7/25/2023 End Date: --    Start Date: 7/10/2023 End Date: 7/25/2023        Follow up in about 1 year (around 7/25/2024) for Medicare Wellness with labs. In addition to their scheduled follow up, the patient has also been instructed to follow up on as needed basis.

## 2023-07-25 NOTE — ASSESSMENT & PLAN NOTE
Lab Results   Component Value Date    HGBA1C 6.3 07/24/2023     Urine micro 7/2023  Foot exam 1/2023  Eye exam with dr. Murrieta       On statin and arb. On trulicity. Well controlled.

## 2023-07-28 RX ORDER — ASPIRIN 81 MG/1
81 TABLET ORAL DAILY
COMMUNITY

## 2023-07-28 NOTE — ASSESSMENT & PLAN NOTE
Well controlled on current prescriptions.   Continue to monitor bp. Contact clinic for concerns. Patient is agreeable to plan and verbalized understanding

## 2023-07-28 NOTE — ASSESSMENT & PLAN NOTE
On pravastatin 20mg. Due to recent labs not at goal.  Will increase to 40mg. New rx sent in. Continue on niacin and fish oil. Did not tolerate zetia    Lab Results   Component Value Date    CHOL 221 (H) 07/24/2023    CHOL 235 (H) 07/20/2022    CHOL 248 03/08/2022     Lab Results   Component Value Date    HDL 49 07/24/2023    HDL 44 07/20/2022    HDL 42 03/08/2022     No results found for: LDLCALC  No results found for: DLDL  Lab Results   Component Value Date    TRIG 94 07/24/2023    TRIG 132 07/20/2022    TRIG 214 03/08/2022       f1 No results found for: CHOLHDL

## 2023-07-28 NOTE — ASSESSMENT & PLAN NOTE
Previously done labs reviewed at time of visit today  mmg 8/2022. Ordered  dexa 8/2022 showed osteopenia.   Colonoscopy 9/2022 with dr. bhandari   No longer does pap smear due to complete hyst for noncancerous reasons    Advanced care planning discussed and paperwork given.

## 2023-08-01 ENCOUNTER — TELEPHONE (OUTPATIENT)
Dept: FAMILY MEDICINE | Facility: CLINIC | Age: 68
End: 2023-08-01
Payer: MEDICARE

## 2023-08-01 NOTE — TELEPHONE ENCOUNTER
Patient needs compound cream sent to [facility] [place]. Script will not let me propose, it will have to be a new script:     Biest 1.75 mg/ml, Apply 0.5 mLs topically every evening.

## 2023-08-01 NOTE — TELEPHONE ENCOUNTER
----- Message from Nani Mcdaniels sent at 8/1/2023 11:31 AM CDT -----  Regarding: meds  .Type:  Needs Medical Advice    Who Called: Pt  Symptoms (please be specific):    How long has patient had these symptoms:    Pharm[facility] name and phone #:  [facility] [place]  Would the patient rather a call back or a response via [facility]? Call back  Best Call Back Number: [phone]  Additional Information: Needs compound cream called in. Pt states pharmacy has been faxing clinic.

## 2023-08-01 NOTE — TELEPHONE ENCOUNTER
I have signed for the following orders AND/OR meds.  Please call the patient and ask the patient to schedule the testing AND/OR inform about any medications that were sent.        Medications Ordered This Encounter   Medications    miscellaneous medical supply Kit     Sig: Biest 1.75 mg/ml, Apply 0.5 mLs topically every evening     Dispense:  1 kit     Refill:  3

## 2023-08-22 ENCOUNTER — HOSPITAL ENCOUNTER (OUTPATIENT)
Dept: RADIOLOGY | Facility: HOSPITAL | Age: 68
Discharge: HOME | End: 2023-08-22
Attending: FAMILY MEDICINE
Payer: MEDICARE

## 2023-08-22 DIAGNOSIS — Z12.31 BREAST CANCER SCREENING BY MAMMOGRAM: ICD-10-CM

## 2023-08-22 PROCEDURE — 77063 MAMMO DIGITAL SCREENING BILAT WITH TOMO: ICD-10-PCS | Mod: 26,,, | Performed by: RADIOLOGY

## 2023-08-22 PROCEDURE — 77067 SCR MAMMO BI INCL CAD: CPT | Mod: 26,,, | Performed by: RADIOLOGY

## 2023-08-22 PROCEDURE — 77067 MAMMO DIGITAL SCREENING BILAT WITH TOMO: ICD-10-PCS | Mod: 26,,, | Performed by: RADIOLOGY

## 2023-08-22 PROCEDURE — 77063 BREAST TOMOSYNTHESIS BI: CPT | Mod: 26,,, | Performed by: RADIOLOGY

## 2023-08-22 PROCEDURE — 77067 SCR MAMMO BI INCL CAD: CPT | Mod: TC

## 2023-10-30 PROBLEM — Z00.00 MEDICARE ANNUAL WELLNESS VISIT, SUBSEQUENT: Status: RESOLVED | Noted: 2022-07-21 | Resolved: 2023-10-30

## 2023-11-21 ENCOUNTER — DOCUMENTATION ONLY (OUTPATIENT)
Dept: FAMILY MEDICINE | Facility: CLINIC | Age: 68
End: 2023-11-21
Payer: MEDICARE

## 2023-11-21 LAB
LEFT EYE DM RETINOPATHY: NEGATIVE
RIGHT EYE DM RETINOPATHY: NEGATIVE

## 2023-12-12 NOTE — TELEPHONE ENCOUNTER
----- Message from Nani Mcdaniels sent at 12/12/2023  4:34 PM CST -----  Regarding: med refill  .Type:  RX Refill Request    Who Called: Pt  Refill or New Rx:Refill  RX Name and Strength:Biest 1.75 mg/ml - Topical  How is the patient currently taking it? (ex. 1XDay):  Is this a 30 day or 90 day RX:  Preferred [facility] with phone number[facility]  Local or Mail Order:Local  Ordering Provider:Kyleigh  Would the patient rather a call back or a response via [facility]? Call back  Best Call Back Number:[phone]  Additional Information:

## 2023-12-13 NOTE — TELEPHONE ENCOUNTER
I have signed for the following orders AND/OR meds.  Please call the patient and ask the patient to schedule the testing AND/OR inform about any medications that were sent.      Medications Ordered This Encounter   Medications    UNABLE TO FIND     Sig: Apply 0.5 mLs topically every evening. BIEST 1.75MG/ML CREAM(LAF)  APPLY 0.5-MLS(1/2 SYRINGEFUL) TOPICALLY TO INNER ARMS/INNER THIGHS EVERY NIGHT AT BEDTIME AND MASSAGE IN...ROTATE SITES each time.

## 2024-01-08 ENCOUNTER — TELEPHONE (OUTPATIENT)
Dept: FAMILY MEDICINE | Facility: CLINIC | Age: 69
End: 2024-01-08
Payer: MEDICARE

## 2024-01-08 DIAGNOSIS — E11.65 TYPE 2 DIABETES MELLITUS WITH HYPERGLYCEMIA, WITHOUT LONG-TERM CURRENT USE OF INSULIN: Primary | ICD-10-CM

## 2024-01-08 RX ORDER — TIRZEPATIDE 2.5 MG/.5ML
2.5 INJECTION, SOLUTION SUBCUTANEOUS
Qty: 4 PEN | Refills: 5 | Status: SHIPPED | OUTPATIENT
Start: 2024-01-08 | End: 2024-02-14 | Stop reason: SDUPTHER

## 2024-01-08 NOTE — TELEPHONE ENCOUNTER
Please advise on alternative medication, patient would like to switch from trulicity due to it being hard to find

## 2024-01-08 NOTE — TELEPHONE ENCOUNTER
----- Message from Rochelle Dunbar sent at 1/8/2024 10:26 AM CST -----  Regarding: advice  Type:  Needs Medical Advice    Who Called: pt    Best Call Back Number: [phone]  Additional Information: called about TRULICITY 1.5 mg/0.5 mL pen injector being on back order and is in need of a refill. She stated that she would like to get something else so she can have something, she stated that she has been calling around and no one has it. Please

## 2024-01-08 NOTE — TELEPHONE ENCOUNTER
Stop trulicity. Replace with mounjaro.  Rx sent in.  Take as directed.  Contact clinic for concerns.     I have signed for the following orders AND/OR meds.  Please call the patient and ask the patient to schedule the testing AND/OR inform about any medications that were sent.        Medications Ordered This Encounter   Medications    tirzepatide (MOUNJARO) 2.5 mg/0.5 mL PnIj     Sig: Inject 2.5 mg into the skin every 7 days.     Dispense:  4 Pen     Refill:  5

## 2024-01-17 ENCOUNTER — OFFICE VISIT (OUTPATIENT)
Dept: FAMILY MEDICINE | Facility: CLINIC | Age: 69
End: 2024-01-17
Payer: MEDICARE

## 2024-01-17 VITALS
BODY MASS INDEX: 36.54 KG/M2 | OXYGEN SATURATION: 99 % | WEIGHT: 227.38 LBS | RESPIRATION RATE: 19 BRPM | TEMPERATURE: 98 F | SYSTOLIC BLOOD PRESSURE: 126 MMHG | DIASTOLIC BLOOD PRESSURE: 67 MMHG | HEIGHT: 66 IN | HEART RATE: 77 BPM

## 2024-01-17 DIAGNOSIS — E11.59 HYPERTENSION ASSOCIATED WITH DIABETES: ICD-10-CM

## 2024-01-17 DIAGNOSIS — I15.2 HYPERTENSION ASSOCIATED WITH DIABETES: ICD-10-CM

## 2024-01-17 DIAGNOSIS — E11.65 TYPE 2 DIABETES MELLITUS WITH HYPERGLYCEMIA, WITHOUT LONG-TERM CURRENT USE OF INSULIN: Primary | ICD-10-CM

## 2024-01-17 DIAGNOSIS — N18.32 STAGE 3B CHRONIC KIDNEY DISEASE: ICD-10-CM

## 2024-01-17 PROCEDURE — 99214 OFFICE O/P EST MOD 30 MIN: CPT | Mod: ,,, | Performed by: NURSE PRACTITIONER

## 2024-01-17 RX ORDER — LOSARTAN POTASSIUM 50 MG/1
50 TABLET ORAL DAILY
Qty: 90 TABLET | Refills: 3 | Status: SHIPPED | OUTPATIENT
Start: 2024-01-17 | End: 2025-01-16

## 2024-01-17 RX ORDER — LOSARTAN POTASSIUM 50 MG/1
50 TABLET ORAL DAILY
Qty: 90 TABLET | Refills: 3 | Status: SHIPPED | OUTPATIENT
Start: 2024-01-17 | End: 2024-01-17 | Stop reason: SDUPTHER

## 2024-01-17 NOTE — PROGRESS NOTES
Subjective:      Patient ID: Shakila Velazquez is a 68 y.o. Black or  female      Chief Complaint: Medication Refill (Losartan)       Past Medical History:   Diagnosis Date    Gout 07/21/2022    Hyperlipidemia associated with type 2 diabetes mellitus 09/03/2021    Hypertension associated with diabetes 01/19/2023    Osteoarthritis of knee 07/21/2022    Stage 3b chronic kidney disease 09/03/2021    Last Assessment & Plan:   Formatting of this note might be different from the original.  Renal indices remain stable at baseline.    Type 2 diabetes mellitus 07/21/2022    Vitamin D deficiency 07/21/2022        HPI  Presents to clinic for follow up.    Has CKD.  seeing renal- [facility]- fozia corey NP. On Fe+ supplements.       HTN/HLD.  BP at goal. Currently taking Norvasc and Losartan.  Tolerating pravastatin 20mg daily. Need refills of Losartan.       DM:   Most recent A1C (6.3); needs recent labs.  States CBG's 120's.  Denies any hypoglycemia.    Current meds: Mounjaro 2.5 mg weekly.   DM eye exam up to date (11/2023)          Patient Care Team:  Regi Arizmendi MD as PCP - General (Family Medicine)  Maurice Cadet MD as Consulting Physician (Gastroenterology)  Juan José Murrieta MD as Consulting Physician (Ophthalmology)  Fozia Corey ACNP as Nurse Practitioner (Nurse Practitioner)  Denita Tse MD as Consulting Physician (Rheumatology)      Review of Systems   Constitutional: Negative.  Negative for appetite change, chills and fever.   HENT: Negative.     Eyes: Negative.  Negative for discharge and redness.   Respiratory: Negative.  Negative for shortness of breath.    Cardiovascular: Negative.  Negative for chest pain.   Gastrointestinal: Negative.    Endocrine: Negative.    Genitourinary: Negative.    Integumentary:  Negative.   Allergic/Immunologic: Negative.    Neurological: Negative.    Psychiatric/Behavioral: Negative.     All other systems reviewed and are negative.           Objective:      Vitals:    01/17/24 1514   BP: 126/67   Pulse: 77   Resp: 19   Temp: 98.3 °F (36.8 °C)      Body mass index is 36.7 kg/m².     Physical Exam  Vitals reviewed.   Constitutional:       Appearance: Normal appearance.   HENT:      Head: Normocephalic.      Mouth/Throat:      Mouth: Mucous membranes are moist.   Eyes:      Conjunctiva/sclera: Conjunctivae normal.      Pupils: Pupils are equal, round, and reactive to light.   Cardiovascular:      Rate and Rhythm: Normal rate and regular rhythm.   Pulmonary:      Effort: Pulmonary effort is normal. No respiratory distress.      Breath sounds: Normal breath sounds.   Musculoskeletal:         General: Normal range of motion.      Cervical back: Normal range of motion and neck supple.   Skin:     General: Skin is warm and dry.   Neurological:      Mental Status: She is alert and oriented to person, place, and time.   Psychiatric:         Mood and Affect: Mood normal.            Current Outpatient Medications:     amLODIPine (NORVASC) 10 MG tablet, Take 1 tablet (10 mg total) by mouth once daily., Disp: 90 tablet, Rfl: 3    aspirin (ECOTRIN) 81 MG EC tablet, Take 81 mg by mouth once daily., Disp: , Rfl:     b complex vitamins capsule, Take 1 capsule by mouth once daily., Disp: , Rfl:     ferrous sulfate (FEOSOL) 325 mg (65 mg iron) Tab tablet, Take 325 mg by mouth., Disp: , Rfl:     medical supply, miscellaneous (MISCELLANEOUS CREAMS TOP), Apply 0.5 mLs topically every evening. Biest 1.75 mg/ml, Disp: , Rfl:     miscellaneous medical supply Kit, Biest 1.75 mg/ml, Apply 0.5 mLs topically every evening, Disp: 1 kit, Rfl: 3    niacin 500 mg TbSR, , Disp: , Rfl:     omega-3s/dha/epa/fish oil/D3 (VITAMIN-D + OMEGA-3 ORAL), Take by mouth., Disp: , Rfl:     pravastatin (PRAVACHOL) 40 MG tablet, Take 1 tablet (40 mg total) by mouth every evening., Disp: 90 tablet, Rfl: 3    tirzepatide (MOUNJARO) 2.5 mg/0.5 mL PnIj, Inject 2.5 mg into the skin every 7 days., Disp:  4 Pen, Rfl: 5    UNABLE TO FIND, BIEST 1.75MG/ML CREAM(LAF) APPLY 0.5-MLS(1/2 SYRINGEFUL) TOPICALLY TO INNER ARMS/INNER THIGHS EVERY NIGHT AT BEDTIME AND MASSAGE IN...ROTATE SITES each time.  Please dispense 90 day supply, Disp: 1 Syringe, Rfl: 3    losartan (COZAAR) 50 MG tablet, Take 1 tablet (50 mg total) by mouth once daily., Disp: 90 tablet, Rfl: 3    Assessment & Plan:     Problem List Items Addressed This Visit          Cardiac/Vascular    Hypertension associated with diabetes     BP at goal  Continue current medication (Norvasc 10 mg and Losartan 50 mg po daily)  Daily BP check; bring log all clinic visits  Instructed to report to ED for any BP greater than 200/100, dizziness, syncope, CP, or SOB  Keep appt. With PCP for follow up  Patient is agreeable to plan and verbalizes understanding           Relevant Medications    losartan (COZAAR) 50 MG tablet       Renal/    Stage 3b chronic kidney disease     Stable  Keep appt with PCP for follow up              Endocrine    Type 2 diabetes mellitus - Primary     Most recent A1C (6.3); needs recent labs.    Labs ordered   Current meds: Mounjaro 2.5 mg weekly.   DM eye exam up to date (11/2023)  Continue Mounjaro  RTC in 1 month for follow up/dosage increase         Relevant Orders    Hemoglobin A1C

## 2024-01-17 NOTE — ASSESSMENT & PLAN NOTE
BP at goal  Continue current medication (Norvasc 10 mg and Losartan 50 mg po daily)  Daily BP check; bring log all clinic visits  Instructed to report to ED for any BP greater than 200/100, dizziness, syncope, CP, or SOB  Keep appt. With PCP for follow up  Patient is agreeable to plan and verbalizes understanding

## 2024-01-17 NOTE — ASSESSMENT & PLAN NOTE
Most recent A1C (6.3); needs recent labs.    Labs ordered   Current meds: Mounjaro 2.5 mg weekly.   DM eye exam up to date (11/2023)  Continue Mounjaro  RTC in 1 month for follow up/dosage increase

## 2024-01-19 ENCOUNTER — LAB VISIT (OUTPATIENT)
Dept: LAB | Facility: HOSPITAL | Age: 69
End: 2024-01-19
Attending: NURSE PRACTITIONER
Payer: MEDICARE

## 2024-01-19 DIAGNOSIS — E11.65 TYPE 2 DIABETES MELLITUS WITH HYPERGLYCEMIA, WITHOUT LONG-TERM CURRENT USE OF INSULIN: ICD-10-CM

## 2024-01-19 DIAGNOSIS — N18.32 CHRONIC KIDNEY DISEASE (CKD) STAGE G3B/A1, MODERATELY DECREASED GLOMERULAR FILTRATION RATE (GFR) BETWEEN 30-44 ML/MIN/1.73 SQUARE METER AND ALBUMINURIA CREATININE RATIO LESS THAN 30 MG/G: Primary | ICD-10-CM

## 2024-01-19 LAB
ALBUMIN SERPL-MCNC: 3.9 G/DL (ref 3.4–4.8)
ALBUMIN/GLOB SERPL: 1.1 RATIO (ref 1.1–2)
ALP SERPL-CCNC: 61 UNIT/L (ref 40–150)
ALT SERPL-CCNC: 20 UNIT/L (ref 0–55)
AST SERPL-CCNC: 23 UNIT/L (ref 5–34)
BASOPHILS # BLD AUTO: 0.02 X10(3)/MCL
BASOPHILS NFR BLD AUTO: 0.3 %
BILIRUB SERPL-MCNC: 0.5 MG/DL
BUN SERPL-MCNC: 22.9 MG/DL (ref 9.8–20.1)
CALCIUM SERPL-MCNC: 9.6 MG/DL (ref 8.4–10.2)
CHLORIDE SERPL-SCNC: 101 MMOL/L (ref 98–107)
CO2 SERPL-SCNC: 26 MMOL/L (ref 23–31)
CREAT SERPL-MCNC: 1.55 MG/DL (ref 0.55–1.02)
EOSINOPHIL # BLD AUTO: 0.02 X10(3)/MCL (ref 0–0.9)
EOSINOPHIL NFR BLD AUTO: 0.3 %
ERYTHROCYTE [DISTWIDTH] IN BLOOD BY AUTOMATED COUNT: 12.6 % (ref 11.5–17)
EST. AVERAGE GLUCOSE BLD GHB EST-MCNC: 171.4 MG/DL
GFR SERPLBLD CREATININE-BSD FMLA CKD-EPI: 36 MLS/MIN/1.73/M2
GLOBULIN SER-MCNC: 3.6 GM/DL (ref 2.4–3.5)
GLUCOSE SERPL-MCNC: 179 MG/DL (ref 82–115)
HBA1C MFR BLD: 7.6 %
HCT VFR BLD AUTO: 35 % (ref 37–47)
HGB BLD-MCNC: 11.3 G/DL (ref 12–16)
IMM GRANULOCYTES # BLD AUTO: 0.02 X10(3)/MCL (ref 0–0.04)
IMM GRANULOCYTES NFR BLD AUTO: 0.3 %
LYMPHOCYTES # BLD AUTO: 0.98 X10(3)/MCL (ref 0.6–4.6)
LYMPHOCYTES NFR BLD AUTO: 16.6 %
MCH RBC QN AUTO: 31 PG (ref 27–31)
MCHC RBC AUTO-ENTMCNC: 32.3 G/DL (ref 33–36)
MCV RBC AUTO: 96.2 FL (ref 80–94)
MONOCYTES # BLD AUTO: 0.84 X10(3)/MCL (ref 0.1–1.3)
MONOCYTES NFR BLD AUTO: 14.3 %
NEUTROPHILS # BLD AUTO: 4.01 X10(3)/MCL (ref 2.1–9.2)
NEUTROPHILS NFR BLD AUTO: 68.2 %
NRBC BLD AUTO-RTO: 0 %
PLATELET # BLD AUTO: 187 X10(3)/MCL (ref 130–400)
PMV BLD AUTO: 10.6 FL (ref 7.4–10.4)
POTASSIUM SERPL-SCNC: 3.9 MMOL/L (ref 3.5–5.1)
PROT SERPL-MCNC: 7.5 GM/DL (ref 5.8–7.6)
RBC # BLD AUTO: 3.64 X10(6)/MCL (ref 4.2–5.4)
SODIUM SERPL-SCNC: 136 MMOL/L (ref 136–145)
WBC # SPEC AUTO: 5.89 X10(3)/MCL (ref 4.5–11.5)

## 2024-01-19 PROCEDURE — 36415 COLL VENOUS BLD VENIPUNCTURE: CPT

## 2024-01-19 PROCEDURE — 83036 HEMOGLOBIN GLYCOSYLATED A1C: CPT

## 2024-01-19 PROCEDURE — 85025 COMPLETE CBC W/AUTO DIFF WBC: CPT

## 2024-01-19 PROCEDURE — 80053 COMPREHEN METABOLIC PANEL: CPT

## 2024-02-14 ENCOUNTER — OFFICE VISIT (OUTPATIENT)
Dept: FAMILY MEDICINE | Facility: CLINIC | Age: 69
End: 2024-02-14
Payer: MEDICARE

## 2024-02-14 DIAGNOSIS — N18.32 STAGE 3B CHRONIC KIDNEY DISEASE: ICD-10-CM

## 2024-02-14 DIAGNOSIS — E11.65 TYPE 2 DIABETES MELLITUS WITH HYPERGLYCEMIA, WITHOUT LONG-TERM CURRENT USE OF INSULIN: Primary | ICD-10-CM

## 2024-02-14 PROCEDURE — 99214 OFFICE O/P EST MOD 30 MIN: CPT | Mod: 95,,, | Performed by: NURSE PRACTITIONER

## 2024-02-14 RX ORDER — TIRZEPATIDE 5 MG/.5ML
5 INJECTION, SOLUTION SUBCUTANEOUS
Qty: 4 PEN | Refills: 1 | Status: SHIPPED | OUTPATIENT
Start: 2024-02-14 | End: 2024-03-20 | Stop reason: ALTCHOICE

## 2024-02-14 NOTE — ASSESSMENT & PLAN NOTE
Most recent A1C (7.6);    Current meds: Mounjaro 2.5 mg weekly. (She has been on Mounjaro x 1 month); No weight loss  Increase Mounjaro to 5 mg weekly  Instructed to follow DM diet  CBGs daily  F/U in 1 month for reevaluation  DM eye exam up to date (11/2023)

## 2024-02-14 NOTE — ASSESSMENT & PLAN NOTE
CKD:  GFR 36;  Following Nephrology  Instructed to Avoid NSAIDS  Keep appt with Nephrology for follow up

## 2024-02-14 NOTE — PROGRESS NOTES
Subjective:      Patient ID: Shakila Velazquez is a 68 y.o. Black or  female      Chief Complaint: 1 month f/u (Mounjaro)    This is a telemedicine note. Patient was treated using telemedicine, real-time audio and video. I, distant provider, conducted the visit from location identified below. The patient participated in the visit at a non- [facility] location selected by the patient identified below. I am licensed in the state where the patient stated they are located. The patient stated that they understood and accepted the privacy and security risks to their information at their location.  Patient was located at home.  I, distant provider, was located at [facility]      Past Medical History:   Diagnosis Date    Gout 07/21/2022    Hyperlipidemia associated with type 2 diabetes mellitus 09/03/2021    Hypertension associated with diabetes 01/19/2023    Osteoarthritis of knee 07/21/2022    Stage 3b chronic kidney disease 09/03/2021    Last Assessment & Plan:   Formatting of this note might be different from the original.  Renal indices remain stable at baseline.    Type 2 diabetes mellitus 07/21/2022    Vitamin D deficiency 07/21/2022        HPI  Diabetes  She has type 2 diabetes mellitus. No MedicAlert identification noted. The initial diagnosis of diabetes was made 2002 years ago. Pertinent negatives for hypoglycemia include no confusion, dizziness, headaches, hunger, mood changes, nervousness/anxiousness, pallor, seizures, sleepiness, speech difficulty, sweats or tremors. Pertinent negatives for diabetes include no blurred vision, no chest pain, no fatigue, no foot paresthesias, no foot ulcerations, no polydipsia, no polyphagia, no polyuria, no visual change, no weakness and no weight loss. Pertinent negatives for hypoglycemia complications include no blackouts, no hospitalization, no nocturnal hypoglycemia, no required assistance and no required glucagon injection. Pertinent negatives for  diabetic complications include no autonomic neuropathy, CVA, heart disease, nephropathy, peripheral neuropathy, PVD or retinopathy. Risk factors for coronary artery disease include dyslipidemia, hypertension and obesity. Current diabetic treatment includes oral agent (monotherapy). She is compliant with treatment some of the time. Her weight is stable. She is following a generally healthy diet. Meal planning includes avoidance of concentrated sweets. She has not had a previous visit with a dietitian. She participates in exercise daily. She monitors blood glucose at home 1-2 x per day. She monitors urine at home <1 x per month. Blood glucose monitoring compliance is good. Her home blood glucose trend is fluctuating minimally. She does not see a podiatrist.Eye exam is current.       DM:   Most recent A1C (7.6);  States CBG's 180's. Denies any hypoglycemia.    Current meds: Mounjaro 2.5 mg weekly. (She has been on Mounjaro x 1 month); No weight loss  DM eye exam up to date (11/2023)    CKD:  GFR 36;  Following Nephrology    Patient Care Team:  Regi Arizmendi MD as PCP - General (Family Medicine)  Maurice Cadet MD as Consulting Physician (Gastroenterology)  Juan José Murrieta MD as Consulting Physician (Ophthalmology)  Fozia Scott ACNP as Nurse Practitioner (Nurse Practitioner)  Denita Tse MD as Consulting Physician (Rheumatology)      Review of Systems   Constitutional:  Negative for fatigue and weight loss.   Eyes:  Negative for blurred vision.   Cardiovascular:  Negative for chest pain.   Endocrine: Negative for polydipsia, polyphagia and polyuria.   Integumentary:  Negative for pallor.   Neurological:  Negative for dizziness, tremors, seizures, speech difficulty, weakness and headaches.   Psychiatric/Behavioral:  Negative for confusion. The patient is not nervous/anxious.            Objective:      There were no vitals filed for this visit.   There is no height or weight on file to calculate BMI.      Physical Exam  Constitutional:       Appearance: Normal appearance.   Pulmonary:      Effort: No respiratory distress.   Neurological:      Mental Status: She is alert and oriented to person, place, and time.            Current Outpatient Medications:     amLODIPine (NORVASC) 10 MG tablet, Take 1 tablet (10 mg total) by mouth once daily., Disp: 90 tablet, Rfl: 3    aspirin (ECOTRIN) 81 MG EC tablet, Take 81 mg by mouth once daily., Disp: , Rfl:     b complex vitamins capsule, Take 1 capsule by mouth once daily., Disp: , Rfl:     ferrous sulfate (FEOSOL) 325 mg (65 mg iron) Tab tablet, Take 325 mg by mouth., Disp: , Rfl:     losartan (COZAAR) 50 MG tablet, Take 1 tablet (50 mg total) by mouth once daily., Disp: 90 tablet, Rfl: 3    medical supply, miscellaneous (MISCELLANEOUS CREAMS TOP), Apply 0.5 mLs topically every evening. Biest 1.75 mg/ml, Disp: , Rfl:     miscellaneous medical supply Kit, Biest 1.75 mg/ml, Apply 0.5 mLs topically every evening, Disp: 1 kit, Rfl: 3    niacin 500 mg TbSR, , Disp: , Rfl:     omega-3s/dha/epa/fish oil/D3 (VITAMIN-D + OMEGA-3 ORAL), Take by mouth., Disp: , Rfl:     pravastatin (PRAVACHOL) 40 MG tablet, Take 1 tablet (40 mg total) by mouth every evening., Disp: 90 tablet, Rfl: 3    UNABLE TO FIND, BIEST 1.75MG/ML CREAM(LAF) APPLY 0.5-MLS(1/2 SYRINGEFUL) TOPICALLY TO INNER ARMS/INNER THIGHS EVERY NIGHT AT BEDTIME AND MASSAGE IN...ROTATE SITES each time.  Please dispense 90 day supply, Disp: 1 Syringe, Rfl: 3    tirzepatide (MOUNJARO) 5 mg/0.5 mL PnIj, Inject 5 mg into the skin every 7 days., Disp: 4 Pen, Rfl: 1    Assessment & Plan:     Problem List Items Addressed This Visit          Renal/    Stage 3b chronic kidney disease     CKD:  GFR 36;  Following Nephrology  Instructed to Avoid NSAIDS  Keep appt with Nephrology for follow up              Endocrine    Type 2 diabetes mellitus - Primary     Most recent A1C (7.6);    Current meds: Mounjaro 2.5 mg weekly. (She has been on  Mounjaro x 1 month); No weight loss  Increase Mounjaro to 5 mg weekly  Instructed to follow DM diet  CBGs daily  F/U in 1 month for reevaluation  DM eye exam up to date (11/2023)         Relevant Medications    tirzepatide (MOUNJARO) 5 mg/0.5 mL PnIj            Total time:  17 minutes

## 2024-02-17 ENCOUNTER — OFFICE VISIT (OUTPATIENT)
Dept: URGENT CARE | Facility: CLINIC | Age: 69
End: 2024-02-17
Payer: MEDICARE

## 2024-02-17 VITALS
RESPIRATION RATE: 18 BRPM | HEIGHT: 64 IN | TEMPERATURE: 99 F | OXYGEN SATURATION: 100 % | WEIGHT: 228 LBS | DIASTOLIC BLOOD PRESSURE: 70 MMHG | BODY MASS INDEX: 38.93 KG/M2 | HEART RATE: 80 BPM | SYSTOLIC BLOOD PRESSURE: 152 MMHG

## 2024-02-17 DIAGNOSIS — S16.1XXA STRAIN OF NECK MUSCLE, INITIAL ENCOUNTER: Primary | ICD-10-CM

## 2024-02-17 PROCEDURE — 99213 OFFICE O/P EST LOW 20 MIN: CPT | Mod: ,,, | Performed by: FAMILY MEDICINE

## 2024-02-17 RX ORDER — CYCLOBENZAPRINE HCL 5 MG
TABLET ORAL
Qty: 20 TABLET | Refills: 0 | Status: SHIPPED | OUTPATIENT
Start: 2024-02-17 | End: 2024-03-20

## 2024-02-17 NOTE — PROGRESS NOTES
"Subjective:      Patient ID: Shakila Velazquez is a 68 y.o. female.    Vitals:  height is 5' 4" (1.626 m) and weight is 103.4 kg (228 lb). Her temperature is 98.5 °F (36.9 °C). Her blood pressure is 152/70 (abnormal) and her pulse is 80. Her respiration is 18 and oxygen saturation is 100%.     Chief Complaint: Neck Pain (R sided neck pain down to R shoulder, pain when turning neck to the right. Symptoms started yesterday morning. OTC Motrin.)    HPI:  68-year-old female present to clinic with concerns of right-sided neck muscle pain down to her right shoulder.  Worse with range of motion.  Denies tingling numbness or weakness to upper extremities.  No fall no trauma.  No headache, no nausea or vomiting.  States possibly the way she slept    ROS :  Constitutional : No fever, no fatigue  Neck : Negative except HPI  Respiratory : No shortness of breath, no wheezing  Cardiovascular : No chest pain  Musculoskeletal : Negative except HPI  Integumentary : No rash, no abnormal lesion  Neurological : Negative for tingling numbness and weakness   Objective:     Physical Exam  General : Alert and oriented, No apparent distress, Afebrile  Neck -:  Cervical spine no point tenderness, right-sided neck muscle pressure to palpate, no visible bruising swelling or rash. .  Respiratory :Lungs are clear to auscultate, Breath sounds are equal  Cardiovascular : Normal rate, normal volume pulse bilateral  Musculoskeletal :  Gait appears normal, joints full range of motion, muscle strength upper and lower extremities equal 5 on 5. Right latissimus dorsi tense, pressure to palpate.  Integumentary : Warm, Dry   Neurologic : Alert and Oriented X 4, sensations intact, motor intact   Assessment:     1. Strain of neck muscle, initial encounter      Plan:   Discussed in detail on physical finding, condition and course.  Topical Edwardo-Veliz or Biofreeze gentle massage locally.  Continue alternating Tylenol ibuprofen for pain.  Prescription medication " mainly at bedtime for muscle spasm as needed, caution can cause drowsiness  Monitor the symptoms closely.  ER precautions with any acute change in symptoms.  Patient voiced understanding  Avoid driving until symptoms improve    Strain of neck muscle, initial encounter  -     cyclobenzaprine (FLEXERIL) 5 MG tablet; 1-2 tabs orally mainly at bedtime for muscle spasm as needed, caution can cause drowsiness  Dispense: 20 tablet; Refill: 0

## 2024-02-17 NOTE — PATIENT INSTRUCTIONS
Discussed in detail on physical finding, condition and course.  Topical Edwardo-Veliz or Biofreeze gentle massage locally.  Continue alternating Tylenol ibuprofen for pain.  Prescription medication mainly at bedtime for muscle spasm as needed, caution can cause drowsiness  Monitor the symptoms closely.  ER precautions with any acute change in symptoms.  Patient voiced understanding  Avoid driving until symptoms improve

## 2024-02-22 ENCOUNTER — LAB VISIT (OUTPATIENT)
Dept: LAB | Facility: HOSPITAL | Age: 69
End: 2024-02-22
Attending: INTERNAL MEDICINE
Payer: MEDICARE

## 2024-02-22 DIAGNOSIS — N18.32 CHRONIC KIDNEY DISEASE (CKD) STAGE G3B/A1, MODERATELY DECREASED GLOMERULAR FILTRATION RATE (GFR) BETWEEN 30-44 ML/MIN/1.73 SQUARE METER AND ALBUMINURIA CREATININE RATIO LESS THAN 30 MG/G: Primary | ICD-10-CM

## 2024-02-22 LAB
ALBUMIN SERPL-MCNC: 3.7 G/DL (ref 3.4–4.8)
ALBUMIN/GLOB SERPL: 0.9 RATIO (ref 1.1–2)
ALP SERPL-CCNC: 66 UNIT/L (ref 40–150)
ALT SERPL-CCNC: 13 UNIT/L (ref 0–55)
AST SERPL-CCNC: 14 UNIT/L (ref 5–34)
BASOPHILS # BLD AUTO: 0.04 X10(3)/MCL
BASOPHILS NFR BLD AUTO: 0.5 %
BILIRUB SERPL-MCNC: 0.3 MG/DL
BUN SERPL-MCNC: 18.6 MG/DL (ref 9.8–20.1)
CALCIUM SERPL-MCNC: 10.1 MG/DL (ref 8.4–10.2)
CHLORIDE SERPL-SCNC: 100 MMOL/L (ref 98–107)
CO2 SERPL-SCNC: 27 MMOL/L (ref 23–31)
CREAT SERPL-MCNC: 1.32 MG/DL (ref 0.55–1.02)
EOSINOPHIL # BLD AUTO: 0.1 X10(3)/MCL (ref 0–0.9)
EOSINOPHIL NFR BLD AUTO: 1.2 %
ERYTHROCYTE [DISTWIDTH] IN BLOOD BY AUTOMATED COUNT: 12.7 % (ref 11.5–17)
GFR SERPLBLD CREATININE-BSD FMLA CKD-EPI: 44 MLS/MIN/1.73/M2
GLOBULIN SER-MCNC: 3.9 GM/DL (ref 2.4–3.5)
GLUCOSE SERPL-MCNC: 199 MG/DL (ref 82–115)
HCT VFR BLD AUTO: 36.2 % (ref 37–47)
HGB BLD-MCNC: 11.6 G/DL (ref 12–16)
IMM GRANULOCYTES # BLD AUTO: 0.03 X10(3)/MCL (ref 0–0.04)
IMM GRANULOCYTES NFR BLD AUTO: 0.3 %
LYMPHOCYTES # BLD AUTO: 1.73 X10(3)/MCL (ref 0.6–4.6)
LYMPHOCYTES NFR BLD AUTO: 20 %
MCH RBC QN AUTO: 30.9 PG (ref 27–31)
MCHC RBC AUTO-ENTMCNC: 32 G/DL (ref 33–36)
MCV RBC AUTO: 96.3 FL (ref 80–94)
MONOCYTES # BLD AUTO: 0.58 X10(3)/MCL (ref 0.1–1.3)
MONOCYTES NFR BLD AUTO: 6.7 %
NEUTROPHILS # BLD AUTO: 6.15 X10(3)/MCL (ref 2.1–9.2)
NEUTROPHILS NFR BLD AUTO: 71.3 %
NRBC BLD AUTO-RTO: 0 %
PLATELET # BLD AUTO: 248 X10(3)/MCL (ref 130–400)
PMV BLD AUTO: 10.3 FL (ref 7.4–10.4)
POTASSIUM SERPL-SCNC: 4.2 MMOL/L (ref 3.5–5.1)
PROT SERPL-MCNC: 7.6 GM/DL (ref 5.8–7.6)
RBC # BLD AUTO: 3.76 X10(6)/MCL (ref 4.2–5.4)
SODIUM SERPL-SCNC: 135 MMOL/L (ref 136–145)
WBC # SPEC AUTO: 8.63 X10(3)/MCL (ref 4.5–11.5)

## 2024-02-22 PROCEDURE — 85025 COMPLETE CBC W/AUTO DIFF WBC: CPT

## 2024-02-22 PROCEDURE — 80053 COMPREHEN METABOLIC PANEL: CPT

## 2024-02-22 PROCEDURE — 36415 COLL VENOUS BLD VENIPUNCTURE: CPT

## 2024-03-11 ENCOUNTER — LAB VISIT (OUTPATIENT)
Dept: LAB | Facility: HOSPITAL | Age: 69
End: 2024-03-11
Attending: INTERNAL MEDICINE
Payer: MEDICARE

## 2024-03-11 DIAGNOSIS — N18.32 CHRONIC KIDNEY DISEASE (CKD) STAGE G3B/A1, MODERATELY DECREASED GLOMERULAR FILTRATION RATE (GFR) BETWEEN 30-44 ML/MIN/1.73 SQUARE METER AND ALBUMINURIA CREATININE RATIO LESS THAN 30 MG/G: Primary | ICD-10-CM

## 2024-03-11 DIAGNOSIS — E11.22 TYPE 2 DIABETES MELLITUS WITH END-STAGE RENAL DISEASE: ICD-10-CM

## 2024-03-11 DIAGNOSIS — I12.9 PARENCHYMAL RENAL HYPERTENSION: ICD-10-CM

## 2024-03-11 DIAGNOSIS — N18.6 TYPE 2 DIABETES MELLITUS WITH END-STAGE RENAL DISEASE: ICD-10-CM

## 2024-03-11 DIAGNOSIS — E83.52 HYPERCALCEMIA: ICD-10-CM

## 2024-03-11 LAB
ANION GAP SERPL CALC-SCNC: 12 MEQ/L
BUN SERPL-MCNC: 26.9 MG/DL (ref 9.8–20.1)
CALCIUM SERPL-MCNC: 9.5 MG/DL (ref 8.4–10.2)
CHLORIDE SERPL-SCNC: 100 MMOL/L (ref 98–107)
CO2 SERPL-SCNC: 26 MMOL/L (ref 23–31)
CREAT SERPL-MCNC: 1.75 MG/DL (ref 0.55–1.02)
CREAT/UREA NIT SERPL: 15
GFR SERPLBLD CREATININE-BSD FMLA CKD-EPI: 31 MLS/MIN/1.73/M2
GLUCOSE SERPL-MCNC: 199 MG/DL (ref 82–115)
POTASSIUM SERPL-SCNC: 4.2 MMOL/L (ref 3.5–5.1)
SODIUM SERPL-SCNC: 138 MMOL/L (ref 136–145)

## 2024-03-11 PROCEDURE — 36415 COLL VENOUS BLD VENIPUNCTURE: CPT

## 2024-03-11 PROCEDURE — 80048 BASIC METABOLIC PNL TOTAL CA: CPT

## 2024-03-20 ENCOUNTER — OFFICE VISIT (OUTPATIENT)
Dept: FAMILY MEDICINE | Facility: CLINIC | Age: 69
End: 2024-03-20
Payer: MEDICARE

## 2024-03-20 DIAGNOSIS — E66.01 SEVERE OBESITY (BMI 35.0-39.9) WITH COMORBIDITY: ICD-10-CM

## 2024-03-20 DIAGNOSIS — I15.2 HYPERTENSION ASSOCIATED WITH DIABETES: ICD-10-CM

## 2024-03-20 DIAGNOSIS — E11.65 TYPE 2 DIABETES MELLITUS WITH HYPERGLYCEMIA, WITHOUT LONG-TERM CURRENT USE OF INSULIN: Primary | ICD-10-CM

## 2024-03-20 DIAGNOSIS — E11.59 HYPERTENSION ASSOCIATED WITH DIABETES: ICD-10-CM

## 2024-03-20 PROCEDURE — 99214 OFFICE O/P EST MOD 30 MIN: CPT | Mod: 95,,, | Performed by: NURSE PRACTITIONER

## 2024-03-20 RX ORDER — AMLODIPINE BESYLATE 10 MG/1
10 TABLET ORAL DAILY
Qty: 90 TABLET | Refills: 3 | Status: SHIPPED | OUTPATIENT
Start: 2024-03-20 | End: 2025-03-20

## 2024-03-20 RX ORDER — DAPAGLIFLOZIN 10 MG/1
10 TABLET, FILM COATED ORAL DAILY
COMMUNITY
Start: 2024-02-28

## 2024-03-20 NOTE — PROGRESS NOTES
Subjective:      Patient ID: Shakila Velazquez is a 68 y.o. Black or  female      Chief Complaint: Follow-up (Mounjaro)    This is a telemedicine note. Patient was treated using telemedicine, real-time audio and video. I, distant provider, conducted the visit from location identified below. The patient participated in the visit at a non- [facility] location selected by the patient identified below. I am licensed in the state where the patient stated they are located. The patient stated that they understood and accepted the privacy and security risks to their information at their location.  Patient was located at home.  YASEMIN, distant provider, was located at [facility]      Past Medical History:   Diagnosis Date    Gout 07/21/2022    Hyperlipidemia associated with type 2 diabetes mellitus 09/03/2021    Hypertension associated with diabetes 01/19/2023    Osteoarthritis of knee 07/21/2022    Stage 3b chronic kidney disease 09/03/2021    Last Assessment & Plan:   Formatting of this note might be different from the original.  Renal indices remain stable at baseline.    Type 2 diabetes mellitus 07/21/2022    Vitamin D deficiency 07/21/2022        HPI  Diabetes  She has type 2 diabetes mellitus. No MedicAlert identification noted. The initial diagnosis of diabetes was made [id number] days ago. Pertinent negatives for hypoglycemia include no confusion, dizziness, headaches, hunger, mood changes, nervousness/anxiousness, pallor, seizures, sleepiness, speech difficulty, sweats or tremors. Pertinent negatives for diabetes include no blurred vision, no chest pain, no fatigue, no foot paresthesias, no foot ulcerations, no polydipsia, no polyphagia, no polyuria, no visual change, no weakness and no weight loss. Pertinent negatives for hypoglycemia complications include no blackouts, no hospitalization, no nocturnal hypoglycemia, no required assistance and no required glucagon injection. Symptoms are stable.  Diabetic complications include nephropathy. Pertinent negatives for diabetic complications include no autonomic neuropathy, CVA, heart disease, peripheral neuropathy, PVD or retinopathy. Risk factors for coronary artery disease include dyslipidemia, hypertension and obesity. Current diabetic treatment includes oral agent (monotherapy). She is compliant with treatment most of the time. She is currently taking insulin pre-breakfast. Insulin injections are given by patient. Rotation sites for injection include the abdominal wall. Her weight is stable. She is following a generally healthy diet. Meal planning includes avoidance of concentrated sweets. She has not had a previous visit with a dietitian. She participates in exercise daily. She monitors blood glucose at home 3-4 x per week. She monitors urine at home <1 x per month. Blood glucose monitoring compliance is good. Her home blood glucose trend is fluctuating minimally. She does not see a podiatrist.Eye exam is current.   Follow-up  Pertinent negatives include no chest pain, fatigue, headaches, visual change or weakness.     DM:   Most recent A1C (7.6);  States CBG's 180's. Denies any hypoglycemia.    Current meds: Mounjaro 5 mg weekly. She has lost 2 pounds since previous visit.  Ready to increase dosage.    DM eye exam up to date (11/2023)     HTN:  Stable.  Needs refills of Norvasc. Denies any headaches, weakness, dizziness, CP, or SOB. Denies any other problems.        Patient Care Team:  Regi Arizmendi MD as PCP - General (Family Medicine)  Maurice Cadet MD as Consulting Physician (Gastroenterology)  Juan José Murrieta MD as Consulting Physician (Ophthalmology)  Denita Tse MD as Consulting Physician (Rheumatology)      Review of Systems   Constitutional:  Negative for fatigue and weight loss.   Eyes:  Negative for blurred vision.   Cardiovascular:  Negative for chest pain.   Endocrine: Negative for polydipsia, polyphagia and polyuria.   Integumentary:   Negative for pallor.   Neurological:  Negative for dizziness, tremors, seizures, speech difficulty, weakness and headaches.   Psychiatric/Behavioral:  Negative for confusion. The patient is not nervous/anxious.            Objective:      There were no vitals filed for this visit.   There is no height or weight on file to calculate BMI.     Physical Exam  Constitutional:       Appearance: Normal appearance.   Pulmonary:      Effort: No respiratory distress.   Neurological:      General: No focal deficit present.      Mental Status: She is alert and oriented to person, place, and time.            Current Outpatient Medications:     aspirin (ECOTRIN) 81 MG EC tablet, Take 81 mg by mouth once daily., Disp: , Rfl:     b complex vitamins capsule, Take 1 capsule by mouth once daily., Disp: , Rfl:     FARXIGA 10 mg tablet, Take 10 mg by mouth once daily., Disp: , Rfl:     ferrous sulfate (FEOSOL) 325 mg (65 mg iron) Tab tablet, Take 325 mg by mouth., Disp: , Rfl:     losartan (COZAAR) 50 MG tablet, Take 1 tablet (50 mg total) by mouth once daily., Disp: 90 tablet, Rfl: 3    medical supply, miscellaneous (MISCELLANEOUS CREAMS TOP), Apply 0.5 mLs topically every evening. Biest 1.75 mg/ml, Disp: , Rfl:     miscellaneous medical supply Kit, Biest 1.75 mg/ml, Apply 0.5 mLs topically every evening, Disp: 1 kit, Rfl: 3    niacin 500 mg TbSR, , Disp: , Rfl:     omega-3s/dha/epa/fish oil/D3 (VITAMIN-D + OMEGA-3 ORAL), Take by mouth., Disp: , Rfl:     pravastatin (PRAVACHOL) 40 MG tablet, Take 1 tablet (40 mg total) by mouth every evening., Disp: 90 tablet, Rfl: 3    UNABLE TO FIND, BIEST 1.75MG/ML CREAM(LAF) APPLY 0.5-MLS(1/2 SYRINGEFUL) TOPICALLY TO INNER ARMS/INNER THIGHS EVERY NIGHT AT BEDTIME AND MASSAGE IN...ROTATE SITES each time.  Please dispense 90 day supply, Disp: 1 Syringe, Rfl: 3    amLODIPine (NORVASC) 10 MG tablet, Take 1 tablet (10 mg total) by mouth once daily., Disp: 90 tablet, Rfl: 3    tirzepatide 7.5 mg/0.5 mL  PnIj, Inject 7.5 mg into the skin every 7 days., Disp: 4 Pen, Rfl: 0    Assessment & Plan:     Problem List Items Addressed This Visit          Cardiac/Vascular    Hypertension associated with diabetes     BP at goal  Med management:  Continue current medication (Norvasc and Losartan)  Daily BP check; bring log all clinic visits  Instructed to report to ED for any BP greater than 200/100, dizziness, syncope, CP, or SOB  Keep appt. With PCP for follow up  Patient is agreeable to plan and verbalizes understanding           Relevant Medications    amLODIPine (NORVASC) 10 MG tablet       Endocrine    Severe obesity (BMI 35.0-39.9) with comorbidity     Continue current exercise regimen  She has lost a total of two pounds   Increase Mounjaro to 7.5 mg weekly  Keep appt for follow up         Type 2 diabetes mellitus - Primary     Most recent A1C (7.6);    Current meds: Mounjaro 5 mg weekly  She has lost a total of two pounds   Increase Mounjaro to 7.5 mg weekly  Instructed to follow DM diet  CBGs daily  F/U in 1 month for reevaluation  DM eye exam up to date (11/2023)         Relevant Medications    tirzepatide 7.5 mg/0.5 mL PnIj

## 2024-03-20 NOTE — ASSESSMENT & PLAN NOTE
BP at goal  Med management:  Continue current medication (Norvasc and Losartan)  Daily BP check; bring log all clinic visits  Instructed to report to ED for any BP greater than 200/100, dizziness, syncope, CP, or SOB  Keep appt. With PCP for follow up  Patient is agreeable to plan and verbalizes understanding

## 2024-03-20 NOTE — ASSESSMENT & PLAN NOTE
Continue current exercise regimen  She has lost a total of two pounds   Increase Mounjaro to 7.5 mg weekly  Keep appt for follow up

## 2024-03-20 NOTE — ASSESSMENT & PLAN NOTE
Most recent A1C (7.6);    Current meds: Mounjaro 5 mg weekly  She has lost a total of two pounds   Increase Mounjaro to 7.5 mg weekly  Instructed to follow DM diet  CBGs daily  F/U in 1 month for reevaluation  DM eye exam up to date (11/2023)

## 2024-03-22 ENCOUNTER — LAB VISIT (OUTPATIENT)
Dept: LAB | Facility: HOSPITAL | Age: 69
End: 2024-03-22
Attending: INTERNAL MEDICINE
Payer: MEDICARE

## 2024-03-22 DIAGNOSIS — E11.22 TYPE 2 DIABETES MELLITUS WITH STAGE 3 CHRONIC KIDNEY DISEASE: ICD-10-CM

## 2024-03-22 DIAGNOSIS — E83.52 HYPERCALCEMIA: ICD-10-CM

## 2024-03-22 DIAGNOSIS — N18.32 STAGE 3B CHRONIC KIDNEY DISEASE: Primary | ICD-10-CM

## 2024-03-22 DIAGNOSIS — I12.9 BENIGN HYPERTENSIVE RENAL DISEASE: ICD-10-CM

## 2024-03-22 DIAGNOSIS — N18.30 TYPE 2 DIABETES MELLITUS WITH STAGE 3 CHRONIC KIDNEY DISEASE: ICD-10-CM

## 2024-03-22 LAB
ALBUMIN SERPL-MCNC: 3.9 G/DL (ref 3.4–4.8)
ALBUMIN/GLOB SERPL: 1.1 RATIO (ref 1.1–2)
ALP SERPL-CCNC: 66 UNIT/L (ref 40–150)
ALT SERPL-CCNC: 12 UNIT/L (ref 0–55)
AST SERPL-CCNC: 14 UNIT/L (ref 5–34)
BASOPHILS # BLD AUTO: 0.04 X10(3)/MCL
BASOPHILS NFR BLD AUTO: 0.6 %
BILIRUB SERPL-MCNC: 0.5 MG/DL
BUN SERPL-MCNC: 21.1 MG/DL (ref 9.8–20.1)
CALCIUM SERPL-MCNC: 9.9 MG/DL (ref 8.4–10.2)
CHLORIDE SERPL-SCNC: 101 MMOL/L (ref 98–107)
CO2 SERPL-SCNC: 24 MMOL/L (ref 23–31)
CREAT 24H UR-MCNC: 1183.9 MG/DAY (ref 950–2490)
CREAT SERPL-MCNC: 1.7 MG/DL (ref 0.55–1.02)
CREAT UR-MCNC: 61.5 MG/DL (ref 45–106)
DEPRECATED CALCIDIOL+CALCIFEROL SERPL-MC: 52.8 NG/ML (ref 30–80)
EOSINOPHIL # BLD AUTO: 0.06 X10(3)/MCL (ref 0–0.9)
EOSINOPHIL NFR BLD AUTO: 0.9 %
ERYTHROCYTE [DISTWIDTH] IN BLOOD BY AUTOMATED COUNT: 13.1 % (ref 11.5–17)
GFR SERPLBLD CREATININE-BSD FMLA CKD-EPI: 32 MLS/MIN/1.73/M2
GLOBULIN SER-MCNC: 3.7 GM/DL (ref 2.4–3.5)
GLUCOSE SERPL-MCNC: 176 MG/DL (ref 82–115)
HCT VFR BLD AUTO: 36.7 % (ref 37–47)
HGB BLD-MCNC: 11.9 G/DL (ref 12–16)
IMM GRANULOCYTES # BLD AUTO: 0.01 X10(3)/MCL (ref 0–0.04)
IMM GRANULOCYTES NFR BLD AUTO: 0.1 %
LYMPHOCYTES # BLD AUTO: 1.41 X10(3)/MCL (ref 0.6–4.6)
LYMPHOCYTES NFR BLD AUTO: 20.3 %
MAGNESIUM SERPL-MCNC: 2.1 MG/DL (ref 1.6–2.6)
MCH RBC QN AUTO: 30.7 PG (ref 27–31)
MCHC RBC AUTO-ENTMCNC: 32.4 G/DL (ref 33–36)
MCV RBC AUTO: 94.6 FL (ref 80–94)
MONOCYTES # BLD AUTO: 0.39 X10(3)/MCL (ref 0.1–1.3)
MONOCYTES NFR BLD AUTO: 5.6 %
NEUTROPHILS # BLD AUTO: 5.02 X10(3)/MCL (ref 2.1–9.2)
NEUTROPHILS NFR BLD AUTO: 72.5 %
NRBC BLD AUTO-RTO: 0 %
PHOSPHATE SERPL-MCNC: 3.3 MG/DL (ref 2.3–4.7)
PLATELET # BLD AUTO: 229 X10(3)/MCL (ref 130–400)
PMV BLD AUTO: 10.2 FL (ref 7.4–10.4)
POTASSIUM SERPL-SCNC: 3.8 MMOL/L (ref 3.5–5.1)
PROT 24H UR-MCNC: NORMAL G/DL
PROT SERPL-MCNC: 7.6 GM/DL (ref 5.8–7.6)
PROT UR STRIP-MCNC: <6.8 MG/DL
PTH-INTACT SERPL-MCNC: 63.3 PG/ML (ref 8.7–77)
RBC # BLD AUTO: 3.88 X10(6)/MCL (ref 4.2–5.4)
SODIUM SERPL-SCNC: 135 MMOL/L (ref 136–145)
TOTAL VOLUME  (OHS): 1925 ML
TOTAL VOLUME  (OHS): 1925 ML
WBC # SPEC AUTO: 6.93 X10(3)/MCL (ref 4.5–11.5)

## 2024-03-22 PROCEDURE — 83735 ASSAY OF MAGNESIUM: CPT

## 2024-03-22 PROCEDURE — 82570 ASSAY OF URINE CREATININE: CPT

## 2024-03-22 PROCEDURE — 36415 COLL VENOUS BLD VENIPUNCTURE: CPT

## 2024-03-22 PROCEDURE — 82306 VITAMIN D 25 HYDROXY: CPT | Mod: GA

## 2024-03-22 PROCEDURE — 80053 COMPREHEN METABOLIC PANEL: CPT

## 2024-03-22 PROCEDURE — 84100 ASSAY OF PHOSPHORUS: CPT

## 2024-03-22 PROCEDURE — 85025 COMPLETE CBC W/AUTO DIFF WBC: CPT

## 2024-03-22 PROCEDURE — 83970 ASSAY OF PARATHORMONE: CPT

## 2024-03-22 PROCEDURE — 84156 ASSAY OF PROTEIN URINE: CPT

## 2024-03-25 ENCOUNTER — TELEPHONE (OUTPATIENT)
Dept: FAMILY MEDICINE | Facility: CLINIC | Age: 69
End: 2024-03-25
Payer: MEDICARE

## 2024-03-25 DIAGNOSIS — E11.65 TYPE 2 DIABETES MELLITUS WITH HYPERGLYCEMIA, WITHOUT LONG-TERM CURRENT USE OF INSULIN: Primary | ICD-10-CM

## 2024-03-25 RX ORDER — TIRZEPATIDE 5 MG/.5ML
5 INJECTION, SOLUTION SUBCUTANEOUS
Qty: 4 PEN | Refills: 3 | Status: SHIPPED | OUTPATIENT
Start: 2024-03-25 | End: 2024-05-27

## 2024-03-25 NOTE — TELEPHONE ENCOUNTER
Pt is taking Mounjaro 7.5 mg. The pharmacy has the 7.5 on back order until the end of April. Pt is requesting 5 mg be sent to the pharmacy. It is in stock. Please advise. Thanks

## 2024-03-25 NOTE — TELEPHONE ENCOUNTER
----- Message from Ronda Baldwin sent at 3/25/2024 11:36 AM CDT -----  Regarding: meds on back order  Type:  Needs Medical Advice    Who Called: pt   Would the patient rather a call back or a response via [facility]? C/b   Best Call Back Number: [phone]  Additional Information: pt is calling in because Mounjaro is on back order and pt is due for a shot on today but nothing is available. Please call to advise.

## 2024-05-27 ENCOUNTER — OFFICE VISIT (OUTPATIENT)
Dept: FAMILY MEDICINE | Facility: CLINIC | Age: 69
End: 2024-05-27
Payer: MEDICARE

## 2024-05-27 VITALS
SYSTOLIC BLOOD PRESSURE: 138 MMHG | BODY MASS INDEX: 37.56 KG/M2 | RESPIRATION RATE: 16 BRPM | HEART RATE: 70 BPM | DIASTOLIC BLOOD PRESSURE: 78 MMHG | TEMPERATURE: 98 F | HEIGHT: 64 IN | WEIGHT: 220 LBS | OXYGEN SATURATION: 99 %

## 2024-05-27 DIAGNOSIS — Z12.31 BREAST CANCER SCREENING BY MAMMOGRAM: ICD-10-CM

## 2024-05-27 DIAGNOSIS — E11.69 HYPERLIPIDEMIA ASSOCIATED WITH TYPE 2 DIABETES MELLITUS: ICD-10-CM

## 2024-05-27 DIAGNOSIS — Z00.00 WELLNESS EXAMINATION: ICD-10-CM

## 2024-05-27 DIAGNOSIS — Z78.0 POSTMENOPAUSAL: ICD-10-CM

## 2024-05-27 DIAGNOSIS — E55.9 VITAMIN D DEFICIENCY: ICD-10-CM

## 2024-05-27 DIAGNOSIS — I15.2 HYPERTENSION ASSOCIATED WITH DIABETES: ICD-10-CM

## 2024-05-27 DIAGNOSIS — E11.59 HYPERTENSION ASSOCIATED WITH DIABETES: ICD-10-CM

## 2024-05-27 DIAGNOSIS — E11.65 TYPE 2 DIABETES MELLITUS WITH HYPERGLYCEMIA, WITHOUT LONG-TERM CURRENT USE OF INSULIN: ICD-10-CM

## 2024-05-27 DIAGNOSIS — N18.32 STAGE 3B CHRONIC KIDNEY DISEASE: ICD-10-CM

## 2024-05-27 DIAGNOSIS — E78.5 HYPERLIPIDEMIA ASSOCIATED WITH TYPE 2 DIABETES MELLITUS: ICD-10-CM

## 2024-05-27 DIAGNOSIS — J06.9 UPPER RESPIRATORY TRACT INFECTION, UNSPECIFIED TYPE: Primary | ICD-10-CM

## 2024-05-27 PROCEDURE — 99214 OFFICE O/P EST MOD 30 MIN: CPT | Mod: ,,, | Performed by: FAMILY MEDICINE

## 2024-05-27 RX ORDER — AMOXICILLIN AND CLAVULANATE POTASSIUM 875; 125 MG/1; MG/1
1 TABLET, FILM COATED ORAL 2 TIMES DAILY
Qty: 20 TABLET | Refills: 0 | Status: SHIPPED | OUTPATIENT
Start: 2024-05-27 | End: 2024-06-06

## 2024-05-27 RX ORDER — PROMETHAZINE HYDROCHLORIDE AND DEXTROMETHORPHAN HYDROBROMIDE 6.25; 15 MG/5ML; MG/5ML
5 SYRUP ORAL EVERY 4 HOURS PRN
Qty: 473 ML | Refills: 0 | Status: SHIPPED | OUTPATIENT
Start: 2024-05-27 | End: 2024-06-12

## 2024-05-27 RX ORDER — BENZONATATE 100 MG/1
100 CAPSULE ORAL 3 TIMES DAILY PRN
Qty: 30 CAPSULE | Refills: 0 | Status: SHIPPED | OUTPATIENT
Start: 2024-05-27 | End: 2024-06-06

## 2024-05-27 NOTE — PROGRESS NOTES
Subjective:        Patient ID: Shakila Velazquez is a 68 y.o. female.    Chief Complaint: Follow-up (Patient reports sinus drip and cough q 2 weeks. Denies fever or sore throat )      Very pleasant patient presents to clinic unaccompanied with complaint.  She is due for her wellness visit in July    She c/o sinus drop and cough x 2 weeks after [employer] graduation.  Was outside after at party and got wet.  Using nasal spray, coricidin, mucinex- all no help.  Cough is wet.  Productive is clear.  Keepin gher up at night.  Chest was hurting due to cough last week.  No fever.  Nasal drip is clear.               Wellness labs 7/2022 showed concern for gout.  Will work on dietary changes prior to starting meds.  No issues of late     Has CKD.  seeing renal- [facility] renal.  Drinking more water.  Saw her 4/2022. started on oral iron 2x/week.  They had her decrease her calcium and losartan dosing by 1/2.        she has htn and hld and is on norvasc and losartan. on asa. zetia did not work. started niacin mid 8/2021. Tolerating pravastatin 20mg daily.      she has diabetes. she is on mounjaro 7.5mg weekly. Had run out of meds x 4 weeks due to pharmacy issue.   last a1c was 6.3 7/2023. Foot exam 1/2023. eye exams with dr. castrejon office 11/2022.  last urine micro 7/2023. am cbgs 130s. still exercising.     Her mobility form completed was completed 4/2019 due to OA of knees.  She has appt with dr. Tse 7/18/23     She is obese. has been on adipex in the past but not currently.     She had a mammogram in 8/2022 which was normal and dexa done 8/2022 showed osteopenia. Had colonoscopy with Dr. Cadet 9/2022. had a complete hysterectomy for noncancerous reasons and therefore no longer does pap smears.  She is on bi:est compounded cream.      She is ALLERGIC to sulfa drugs. She drinks alcohol rarely. She does not smoke.  completed covid series and booster. utd on pneumonia shots. completed shingrix series.                 Review of Systems  "  Constitutional: Negative.  Negative for fever.   HENT:  Positive for congestion, ear pain, postnasal drip, rhinorrhea and sinus pressure.    Respiratory:  Positive for cough.    Cardiovascular: Negative.    Gastrointestinal: Negative.    Genitourinary: Negative.          Review of patient's allergies indicates:   Allergen Reactions    Sulfa (sulfonamide antibiotics) Hives and Itching      Vitals:    05/27/24 1059   BP: 138/78   BP Location: Left arm   Pulse: 70   Resp: 16   Temp: 97.7 °F (36.5 °C)   TempSrc: Temporal   SpO2: 99%   Weight: 99.8 kg (220 lb)   Height: 5' 4" (1.626 m)      Social History     Socioeconomic History    Marital status:    Tobacco Use    Smoking status: Never    Smokeless tobacco: Never   Substance and Sexual Activity    Alcohol use: Never    Drug use: Never    Sexual activity: Yes     Partners: Female     Birth control/protection: None     Social Determinants of Health     Financial Resource Strain: Low Risk  (1/10/2024)    Overall Financial Resource Strain (CARDIA)     Difficulty of Paying Living Expenses: Not hard at all   Food Insecurity: No Food Insecurity (1/10/2024)    Hunger Vital Sign     Worried About Running Out of Food in the Last Year: Never true     Ran Out of Food in the Last Year: Never true   Transportation Needs: No Transportation Needs (1/10/2024)    PRAPARE - Transportation     Lack of Transportation (Medical): No     Lack of Transportation (Non-Medical): No   Physical Activity: Sufficiently Active (1/10/2024)    Exercise Vital Sign     Days of Exercise per Week: 5 days     Minutes of Exercise per Session: 70 min   Stress: No Stress Concern Present (1/10/2024)    [employer] - Occupational Stress Questionnaire     Feeling of Stress : Not at all   Housing Stability: Low Risk  (1/10/2024)    Housing Stability Vital Sign     Unable to Pay for Housing in the Last Year: No     Number of Places Lived in the Last Year: 1     Unstable Housing " in the Last Year: No      Family History   Problem Relation Name Age of Onset    Cancer Mother Carol Swain     Cancer Sister Keri Swain     Cancer Daughter Prateek Hong     Diabetes Paternal Aunt Saloni Swain     Cancer Maternal Grandmother Dania Wilturner     Cancer Maternal Grandfather Arjun Wilturner     Cancer Paternal Grandmother [place] Swain     Cancer Paternal Grandfather Timmy Delarosaura           Objective:     Physical Exam  Vitals and nursing note reviewed.   Constitutional:       Appearance: Normal appearance. She is obese.   HENT:      Head: Normocephalic and atraumatic.      Nose: Nose normal.      Mouth/Throat:      Mouth: Mucous membranes are moist.      Pharynx: Oropharynx is clear.   Eyes:      Extraocular Movements: Extraocular movements intact.   Cardiovascular:      Rate and Rhythm: Normal rate and regular rhythm.      Pulses: Normal pulses.      Heart sounds: Normal heart sounds.   Pulmonary:      Effort: Pulmonary effort is normal.      Breath sounds: Normal breath sounds.   Musculoskeletal:         General: Normal range of motion.      Cervical back: Normal range of motion.   Skin:     General: Skin is warm and dry.   Neurological:      General: No focal deficit present.      Mental Status: She is alert and oriented to person, place, and time. Mental status is at baseline.   Psychiatric:         Mood and Affect: Mood normal.       Current Outpatient Medications on File Prior to Visit   Medication Sig Dispense Refill    amLODIPine (NORVASC) 10 MG tablet Take 1 tablet (10 mg total) by mouth once daily. 90 tablet 3    aspirin (ECOTRIN) 81 MG EC tablet Take 81 mg by mouth once daily.      b complex vitamins capsule Take 1 capsule by mouth once daily.      FARXIGA 10 mg tablet Take 10 mg by mouth once daily.      ferrous sulfate (FEOSOL) 325 mg (65 mg iron) Tab tablet Take 325 mg by mouth.      losartan (COZAAR) 50 MG tablet Take 1 tablet (50 mg total) by mouth once  daily. 90 tablet 3    medical supply, miscellaneous (MISCELLANEOUS CREAMS TOP) Apply 0.5 mLs topically every evening. Biest 1.75 mg/ml      miscellaneous medical supply Kit Biest 1.75 mg/ml, Apply 0.5 mLs topically every evening 1 kit 3    niacin 500 mg TbSR       omega-3s/dha/epa/fish oil/D3 (VITAMIN-D + OMEGA-3 ORAL) Take by mouth.      pravastatin (PRAVACHOL) 40 MG tablet Take 1 tablet (40 mg total) by mouth every evening. 90 tablet 3    UNABLE TO FIND BIEST 1.75MG/ML CREAM(LAF)  APPLY 0.5-MLS(1/2 SYRINGEFUL) TOPICALLY TO INNER ARMS/INNER THIGHS EVERY NIGHT AT BEDTIME AND MASSAGE IN...ROTATE SITES each time.    Please dispense 90 day supply 1 Syringe 3    UNABLE TO FIND BIEST 1.75MG/ML CREAM(LAF)      [DISCONTINUED] tirzepatide 7.5 mg/0.5 mL PnIj Inject 7.5 mg into the skin every 7 days. 4 Pen 0    [DISCONTINUED] tirzepatide (MOUNJARO) 5 mg/0.5 mL PnIj Inject 5 mg into the skin every 7 days. (Patient not taking: Reported on 5/27/2024) 4 Pen 3     No current facility-administered medications on file prior to visit.     Health Maintenance   Topic Date Due    Foot Exam  01/19/2024    Hemoglobin A1c  04/19/2024    Mammogram  08/22/2024    Lipid Panel  07/24/2024    TETANUS VACCINE  08/01/2024    Eye Exam  11/21/2024    DEXA Scan  08/17/2025    Colorectal Cancer Screening  09/21/2032    Hepatitis C Screening  Completed    Shingles Vaccine  Completed      Results for orders placed or performed in visit on 03/22/24   Comprehensive Metabolic Panel   Result Value Ref Range    Sodium 135 (L) 136 - 145 mmol/L    Potassium 3.8 3.5 - 5.1 mmol/L    Chloride 101 98 - 107 mmol/L    CO2 24 23 - 31 mmol/L    Glucose 176 (H) 82 - 115 mg/dL    Blood Urea Nitrogen 21.1 (H) 9.8 - 20.1 mg/dL    Creatinine 1.70 (H) 0.55 - 1.02 mg/dL    Calcium 9.9 8.4 - 10.2 mg/dL    Protein Total 7.6 5.8 - 7.6 gm/dL    Albumin 3.9 3.4 - 4.8 g/dL    Globulin 3.7 (H) 2.4 - 3.5 gm/dL    Albumin/Globulin Ratio 1.1 1.1 - 2.0 ratio    Bilirubin Total 0.5  <=1.5 mg/dL    ALP 66 40 - 150 unit/L    ALT 12 0 - 55 unit/L    AST 14 5 - 34 unit/L    eGFR 32 mls/min/1.73/m2   Magnesium   Result Value Ref Range    Magnesium Level 2.10 1.60 - 2.60 mg/dL   Phosphorus   Result Value Ref Range    Phosphorus Level 3.3 2.3 - 4.7 mg/dL   PTH, Intact   Result Value Ref Range    PARATHYROID HORMONE INTACT 63.3 8.7 - 77.0 pg/mL   Vitamin D   Result Value Ref Range    Vitamin D 52.8 30.0 - 80.0 ng/mL   Creatinine, 24 Hr Urine   Result Value Ref Range    Total Volume 1,925.0 mL    Urine Creatinine 61.5 45.0 - 106.0 mg/dL    Urine 24 Hour Creatinine 1,183.9 950.0 - 2,490.0 mg/day   Protein, 24 Hr Urine   Result Value Ref Range    Total Volume 1,925.0 mL    Urine Protein Level <6.8 mg/dL    Urine 24 Hour Protein     CBC with Differential   Result Value Ref Range    WBC 6.93 4.50 - 11.50 x10(3)/mcL    RBC 3.88 (L) 4.20 - 5.40 x10(6)/mcL    Hgb 11.9 (L) 12.0 - 16.0 g/dL    Hct 36.7 (L) 37.0 - 47.0 %    MCV 94.6 (H) 80.0 - 94.0 fL    MCH 30.7 27.0 - 31.0 pg    MCHC 32.4 (L) 33.0 - 36.0 g/dL    RDW 13.1 11.5 - 17.0 %    Platelet 229 130 - 400 x10(3)/mcL    MPV 10.2 7.4 - 10.4 fL    Neut % 72.5 %    Lymph % 20.3 %    Mono % 5.6 %    Eos % 0.9 %    Basophil % 0.6 %    Lymph # 1.41 0.6 - 4.6 x10(3)/mcL    Neut # 5.02 2.1 - 9.2 x10(3)/mcL    Mono # 0.39 0.1 - 1.3 x10(3)/mcL    Eos # 0.06 0 - 0.9 x10(3)/mcL    Baso # 0.04 <=0.2 x10(3)/mcL    IG# 0.01 0 - 0.04 x10(3)/mcL    IG% 0.1 %    NRBC% 0.0 %          Assessment & Plan:     Active Problem List with Overview Notes    Diagnosis Date Noted    URI (upper respiratory infection) 05/27/2024    Hypertension associated with diabetes 01/19/2023    Postmenopausal 07/21/2022    Breast cancer screening by mammogram 07/21/2022    Vitamin D deficiency 07/21/2022    Type 2 diabetes mellitus 07/21/2022    Advanced care planning/counseling discussion 07/21/2022    Osteoarthritis of knee 07/21/2022    Gout 07/21/2022    Colon cancer screening 07/21/2022     Severe obesity (BMI 35.0-39.9) with comorbidity     Benign hypertensive kidney disease with chronic kidney disease 09/03/2021     Last Assessment & Plan:   Formatting of this note might be different from the original.  BP significantly improved.  Continue current regimen.  Continue home BP monitoring.      Hyperlipidemia associated with type 2 diabetes mellitus 09/03/2021    Stage 3b chronic kidney disease 09/03/2021     Last Assessment & Plan:   Formatting of this note might be different from the original.  Renal indices remain stable at baseline.         1. Upper respiratory tract infection, unspecified type  Assessment & Plan:  Due to duration, will treat.  Will send in antibiotics and cough syrup and pills. Take as directed.  Syrup may cause drowsiness and do not take before work or driving.     Orders:  -     amoxicillin-clavulanate 875-125mg (AUGMENTIN) 875-125 mg per tablet; Take 1 tablet by mouth 2 (two) times daily. for 10 days  Dispense: 20 tablet; Refill: 0  -     promethazine-dextromethorphan (PROMETHAZINE-DM) 6.25-15 mg/5 mL Syrp; Take 5 mLs by mouth every 4 (four) hours as needed (cough). May cause drowsiness. Do not take before work or driving  Dispense: 473 mL; Refill: 0  -     benzonatate (TESSALON) 100 MG capsule; Take 1 capsule (100 mg total) by mouth 3 (three) times daily as needed for Cough.  Dispense: 30 capsule; Refill: 0  -     CBC Auto Differential; Future; Expected date: 05/27/2024  -     Comprehensive Metabolic Panel; Future; Expected date: 05/27/2024  -     Lipid Panel; Future; Expected date: 05/27/2024  -     TSH; Future; Expected date: 05/27/2024  -     Hemoglobin A1C; Future; Expected date: 05/27/2024  -     Urinalysis; Future; Expected date: 05/27/2024  -     Microalbumin/Creatinine Ratio, Urine; Future; Expected date: 05/27/2024    2. Type 2 diabetes mellitus with hyperglycemia, without long-term current use of insulin  Assessment & Plan:  Lab Results   Component Value Date    HGBA1C  7.6 (H) 01/19/2024     Urine micro 7/2023  Foot exam 1/2023  Eye exam with dr. Murrieta       On statin and arb. On mounjaro 7.5mg rx sent in.  Well controlled.     Orders:  -     tirzepatide 7.5 mg/0.5 mL PnIj; Inject 7.5 mg into the skin every 7 days.  Dispense: 6 mL; Refill: 3  -     CBC Auto Differential; Future; Expected date: 05/27/2024  -     Comprehensive Metabolic Panel; Future; Expected date: 05/27/2024  -     Lipid Panel; Future; Expected date: 05/27/2024  -     TSH; Future; Expected date: 05/27/2024  -     Hemoglobin A1C; Future; Expected date: 05/27/2024  -     Urinalysis; Future; Expected date: 05/27/2024  -     Microalbumin/Creatinine Ratio, Urine; Future; Expected date: 05/27/2024    3. Hypertension associated with diabetes  Assessment & Plan:  Well controlled on current prescriptions.   Continue to monitor bp. Contact clinic for concerns. Patient is agreeable to plan and verbalized understanding    Orders:  -     CBC Auto Differential; Future; Expected date: 05/27/2024  -     Comprehensive Metabolic Panel; Future; Expected date: 05/27/2024  -     Lipid Panel; Future; Expected date: 05/27/2024  -     TSH; Future; Expected date: 05/27/2024  -     Hemoglobin A1C; Future; Expected date: 05/27/2024  -     Urinalysis; Future; Expected date: 05/27/2024  -     Microalbumin/Creatinine Ratio, Urine; Future; Expected date: 05/27/2024    4. Breast cancer screening by mammogram  Assessment & Plan:  [facility] 8/2023. ordered    Orders:  -     CBC Auto Differential; Future; Expected date: 05/27/2024  -     Comprehensive Metabolic Panel; Future; Expected date: 05/27/2024  -     Lipid Panel; Future; Expected date: 05/27/2024  -     TSH; Future; Expected date: 05/27/2024  -     Hemoglobin A1C; Future; Expected date: 05/27/2024  -     Urinalysis; Future; Expected date: 05/27/2024  -     Microalbumin/Creatinine Ratio, Urine; Future; Expected date: 05/27/2024  -     Mammo Digital Screening Bilat w/ Reymundo; Future; Expected date:  08/27/2024    5. Postmenopausal  Assessment & Plan:  dexa 8/2022 showed osteopenia. On calcium and vit d. Encouraged weight bearing exercises. Repeat ordered    Orders:  -     DXA Bone Density Axial Skeleton 1 or more sites; Future; Expected date: 08/27/2024    6. Wellness examination  -     CBC Auto Differential; Future; Expected date: 05/27/2024  -     Comprehensive Metabolic Panel; Future; Expected date: 05/27/2024  -     Lipid Panel; Future; Expected date: 05/27/2024  -     TSH; Future; Expected date: 05/27/2024  -     Hemoglobin A1C; Future; Expected date: 05/27/2024  -     Urinalysis; Future; Expected date: 05/27/2024  -     Microalbumin/Creatinine Ratio, Urine; Future; Expected date: 05/27/2024    7. Hyperlipidemia associated with type 2 diabetes mellitus  -     CBC Auto Differential; Future; Expected date: 05/27/2024  -     Comprehensive Metabolic Panel; Future; Expected date: 05/27/2024  -     Lipid Panel; Future; Expected date: 05/27/2024  -     TSH; Future; Expected date: 05/27/2024  -     Hemoglobin A1C; Future; Expected date: 05/27/2024  -     Urinalysis; Future; Expected date: 05/27/2024  -     Microalbumin/Creatinine Ratio, Urine; Future; Expected date: 05/27/2024    8. Stage 3b chronic kidney disease  Overview:  Last Assessment & Plan:   Formatting of this note might be different from the original.  Renal indices remain stable at baseline.    Orders:  -     CBC Auto Differential; Future; Expected date: 05/27/2024  -     Comprehensive Metabolic Panel; Future; Expected date: 05/27/2024  -     Lipid Panel; Future; Expected date: 05/27/2024  -     TSH; Future; Expected date: 05/27/2024  -     Hemoglobin A1C; Future; Expected date: 05/27/2024  -     Urinalysis; Future; Expected date: 05/27/2024  -     Microalbumin/Creatinine Ratio, Urine; Future; Expected date: 05/27/2024    9. Vitamin D deficiency  -     CBC Auto Differential; Future; Expected date: 05/27/2024  -     Comprehensive Metabolic Panel;  Future; Expected date: 05/27/2024  -     Lipid Panel; Future; Expected date: 05/27/2024  -     TSH; Future; Expected date: 05/27/2024  -     Hemoglobin A1C; Future; Expected date: 05/27/2024  -     Urinalysis; Future; Expected date: 05/27/2024  -     Microalbumin/Creatinine Ratio, Urine; Future; Expected date: 05/27/2024         Follow up for as scheduled or sooner prn.

## 2024-05-27 NOTE — ASSESSMENT & PLAN NOTE
dexa 8/2022 showed osteopenia. On calcium and vit d. Encouraged weight bearing exercises. Repeat ordered

## 2024-05-27 NOTE — ASSESSMENT & PLAN NOTE
Lab Results   Component Value Date    HGBA1C 7.6 (H) 01/19/2024     Urine micro 7/2023  Foot exam 1/2023  Eye exam with dr. Murrieta       On statin and arb. On mounjaro 7.5mg rx sent in.  Well controlled.

## 2024-05-27 NOTE — ASSESSMENT & PLAN NOTE
Due to duration, will treat.  Will send in antibiotics and cough syrup and pills. Take as directed.  Syrup may cause drowsiness and do not take before work or driving.

## 2024-06-21 ENCOUNTER — LAB VISIT (OUTPATIENT)
Dept: LAB | Facility: HOSPITAL | Age: 69
End: 2024-06-21
Attending: INTERNAL MEDICINE
Payer: MEDICARE

## 2024-06-21 DIAGNOSIS — N18.32 STAGE 3B CHRONIC KIDNEY DISEASE: Primary | ICD-10-CM

## 2024-06-21 DIAGNOSIS — E83.52 HYPERCALCEMIA: ICD-10-CM

## 2024-06-21 DIAGNOSIS — E11.22 TYPE 2 DIABETES MELLITUS WITH CHRONIC KIDNEY DISEASE: ICD-10-CM

## 2024-06-21 DIAGNOSIS — I12.9 HYPERTENSION, RENAL: ICD-10-CM

## 2024-06-21 LAB
ALBUMIN SERPL-MCNC: 3.9 G/DL (ref 3.4–4.8)
ALBUMIN/GLOB SERPL: 1 RATIO (ref 1.1–2)
ALP SERPL-CCNC: 63 UNIT/L (ref 40–150)
ALT SERPL-CCNC: 15 UNIT/L (ref 0–55)
ANION GAP SERPL CALC-SCNC: 11 MEQ/L
AST SERPL-CCNC: 17 UNIT/L (ref 5–34)
BASOPHILS # BLD AUTO: 0.04 X10(3)/MCL
BASOPHILS NFR BLD AUTO: 0.5 %
BILIRUB SERPL-MCNC: 0.5 MG/DL
BUN SERPL-MCNC: 25.7 MG/DL (ref 9.8–20.1)
CALCIUM SERPL-MCNC: 10.4 MG/DL (ref 8.4–10.2)
CHLORIDE SERPL-SCNC: 103 MMOL/L (ref 98–107)
CO2 SERPL-SCNC: 24 MMOL/L (ref 23–31)
CREAT SERPL-MCNC: 1.61 MG/DL (ref 0.55–1.02)
CREAT/UREA NIT SERPL: 16
EOSINOPHIL # BLD AUTO: 0.12 X10(3)/MCL (ref 0–0.9)
EOSINOPHIL NFR BLD AUTO: 1.5 %
ERYTHROCYTE [DISTWIDTH] IN BLOOD BY AUTOMATED COUNT: 13.2 % (ref 11.5–17)
GFR SERPLBLD CREATININE-BSD FMLA CKD-EPI: 35 ML/MIN/1.73/M2
GLOBULIN SER-MCNC: 3.8 GM/DL (ref 2.4–3.5)
GLUCOSE SERPL-MCNC: 114 MG/DL (ref 82–115)
HCT VFR BLD AUTO: 37.3 % (ref 37–47)
HGB BLD-MCNC: 12.4 G/DL (ref 12–16)
IMM GRANULOCYTES # BLD AUTO: 0.02 X10(3)/MCL (ref 0–0.04)
IMM GRANULOCYTES NFR BLD AUTO: 0.3 %
LYMPHOCYTES # BLD AUTO: 2.03 X10(3)/MCL (ref 0.6–4.6)
LYMPHOCYTES NFR BLD AUTO: 25.4 %
MAGNESIUM SERPL-MCNC: 2 MG/DL (ref 1.6–2.6)
MCH RBC QN AUTO: 30.8 PG (ref 27–31)
MCHC RBC AUTO-ENTMCNC: 33.2 G/DL (ref 33–36)
MCV RBC AUTO: 92.8 FL (ref 80–94)
MONOCYTES # BLD AUTO: 0.55 X10(3)/MCL (ref 0.1–1.3)
MONOCYTES NFR BLD AUTO: 6.9 %
NEUTROPHILS # BLD AUTO: 5.23 X10(3)/MCL (ref 2.1–9.2)
NEUTROPHILS NFR BLD AUTO: 65.4 %
NRBC BLD AUTO-RTO: 0 %
PHOSPHATE SERPL-MCNC: 3.5 MG/DL (ref 2.3–4.7)
PLATELET # BLD AUTO: 226 X10(3)/MCL (ref 130–400)
PMV BLD AUTO: 10.1 FL (ref 7.4–10.4)
POTASSIUM SERPL-SCNC: 4.2 MMOL/L (ref 3.5–5.1)
PROT SERPL-MCNC: 7.7 GM/DL (ref 5.8–7.6)
RBC # BLD AUTO: 4.02 X10(6)/MCL (ref 4.2–5.4)
SODIUM SERPL-SCNC: 138 MMOL/L (ref 136–145)
WBC # BLD AUTO: 7.99 X10(3)/MCL (ref 4.5–11.5)

## 2024-06-21 PROCEDURE — 84100 ASSAY OF PHOSPHORUS: CPT

## 2024-06-21 PROCEDURE — 80053 COMPREHEN METABOLIC PANEL: CPT

## 2024-06-21 PROCEDURE — 83735 ASSAY OF MAGNESIUM: CPT

## 2024-06-21 PROCEDURE — 36415 COLL VENOUS BLD VENIPUNCTURE: CPT

## 2024-06-21 PROCEDURE — 85025 COMPLETE CBC W/AUTO DIFF WBC: CPT

## 2024-07-16 DIAGNOSIS — E78.5 HYPERLIPIDEMIA ASSOCIATED WITH TYPE 2 DIABETES MELLITUS: ICD-10-CM

## 2024-07-16 DIAGNOSIS — E11.69 HYPERLIPIDEMIA ASSOCIATED WITH TYPE 2 DIABETES MELLITUS: ICD-10-CM

## 2024-07-16 RX ORDER — PRAVASTATIN SODIUM 40 MG/1
40 TABLET ORAL NIGHTLY
Qty: 90 TABLET | Refills: 3 | Status: SHIPPED | OUTPATIENT
Start: 2024-07-16 | End: 2025-07-16

## 2024-07-24 ENCOUNTER — LAB VISIT (OUTPATIENT)
Dept: LAB | Facility: HOSPITAL | Age: 69
End: 2024-07-24
Attending: FAMILY MEDICINE
Payer: MEDICARE

## 2024-07-24 DIAGNOSIS — Z12.31 BREAST CANCER SCREENING BY MAMMOGRAM: ICD-10-CM

## 2024-07-24 DIAGNOSIS — J06.9 UPPER RESPIRATORY TRACT INFECTION, UNSPECIFIED TYPE: ICD-10-CM

## 2024-07-24 DIAGNOSIS — E55.9 VITAMIN D DEFICIENCY: ICD-10-CM

## 2024-07-24 DIAGNOSIS — E11.65 TYPE 2 DIABETES MELLITUS WITH HYPERGLYCEMIA, WITHOUT LONG-TERM CURRENT USE OF INSULIN: ICD-10-CM

## 2024-07-24 DIAGNOSIS — Z00.00 WELLNESS EXAMINATION: ICD-10-CM

## 2024-07-24 DIAGNOSIS — I15.2 HYPERTENSION ASSOCIATED WITH DIABETES: ICD-10-CM

## 2024-07-24 DIAGNOSIS — E11.69 HYPERLIPIDEMIA ASSOCIATED WITH TYPE 2 DIABETES MELLITUS: ICD-10-CM

## 2024-07-24 DIAGNOSIS — N18.32 STAGE 3B CHRONIC KIDNEY DISEASE: ICD-10-CM

## 2024-07-24 DIAGNOSIS — E78.5 HYPERLIPIDEMIA ASSOCIATED WITH TYPE 2 DIABETES MELLITUS: ICD-10-CM

## 2024-07-24 DIAGNOSIS — E11.59 HYPERTENSION ASSOCIATED WITH DIABETES: ICD-10-CM

## 2024-07-24 LAB
ALBUMIN SERPL-MCNC: 4 G/DL (ref 3.4–4.8)
ALBUMIN/GLOB SERPL: 1.1 RATIO (ref 1.1–2)
ALP SERPL-CCNC: 61 UNIT/L (ref 40–150)
ALT SERPL-CCNC: 21 UNIT/L (ref 0–55)
ANION GAP SERPL CALC-SCNC: 7 MEQ/L
AST SERPL-CCNC: 14 UNIT/L (ref 5–34)
BASOPHILS # BLD AUTO: 0.06 X10(3)/MCL
BASOPHILS NFR BLD AUTO: 0.9 %
BILIRUB SERPL-MCNC: 0.5 MG/DL
BUN SERPL-MCNC: 23.3 MG/DL (ref 9.8–20.1)
CALCIUM SERPL-MCNC: 10.1 MG/DL (ref 8.4–10.2)
CHLORIDE SERPL-SCNC: 102 MMOL/L (ref 98–107)
CHOLEST SERPL-MCNC: 209 MG/DL
CHOLEST/HDLC SERPL: 5 {RATIO} (ref 0–5)
CO2 SERPL-SCNC: 28 MMOL/L (ref 23–31)
CREAT SERPL-MCNC: 1.58 MG/DL (ref 0.55–1.02)
CREAT UR-MCNC: 114 MG/DL (ref 45–106)
CREAT/UREA NIT SERPL: 15
EOSINOPHIL # BLD AUTO: 0.1 X10(3)/MCL (ref 0–0.9)
EOSINOPHIL NFR BLD AUTO: 1.5 %
ERYTHROCYTE [DISTWIDTH] IN BLOOD BY AUTOMATED COUNT: 13.3 % (ref 11.5–17)
EST. AVERAGE GLUCOSE BLD GHB EST-MCNC: 119.8 MG/DL
GFR SERPLBLD CREATININE-BSD FMLA CKD-EPI: 35 ML/MIN/1.73/M2
GLOBULIN SER-MCNC: 3.5 GM/DL (ref 2.4–3.5)
GLUCOSE SERPL-MCNC: 138 MG/DL (ref 82–115)
HBA1C MFR BLD: 5.8 %
HCT VFR BLD AUTO: 39.3 % (ref 37–47)
HDLC SERPL-MCNC: 42 MG/DL (ref 35–60)
HGB BLD-MCNC: 12.7 G/DL (ref 12–16)
IMM GRANULOCYTES # BLD AUTO: 0.02 X10(3)/MCL (ref 0–0.04)
IMM GRANULOCYTES NFR BLD AUTO: 0.3 %
LDLC SERPL CALC-MCNC: 135 MG/DL (ref 50–140)
LYMPHOCYTES # BLD AUTO: 1.48 X10(3)/MCL (ref 0.6–4.6)
LYMPHOCYTES NFR BLD AUTO: 21.5 %
MCH RBC QN AUTO: 31.1 PG (ref 27–31)
MCHC RBC AUTO-ENTMCNC: 32.3 G/DL (ref 33–36)
MCV RBC AUTO: 96.1 FL (ref 80–94)
MICROALBUMIN UR-MCNC: 5.4 UG/ML
MICROALBUMIN/CREAT RATIO PNL UR: 4.7 MG/GM CR (ref 0–30)
MONOCYTES # BLD AUTO: 0.44 X10(3)/MCL (ref 0.1–1.3)
MONOCYTES NFR BLD AUTO: 6.4 %
NEUTROPHILS # BLD AUTO: 4.77 X10(3)/MCL (ref 2.1–9.2)
NEUTROPHILS NFR BLD AUTO: 69.4 %
NRBC BLD AUTO-RTO: 0 %
PLATELET # BLD AUTO: 229 X10(3)/MCL (ref 130–400)
PMV BLD AUTO: 10.1 FL (ref 7.4–10.4)
POTASSIUM SERPL-SCNC: 4.1 MMOL/L (ref 3.5–5.1)
PROT SERPL-MCNC: 7.5 GM/DL (ref 5.8–7.6)
RBC # BLD AUTO: 4.09 X10(6)/MCL (ref 4.2–5.4)
SODIUM SERPL-SCNC: 137 MMOL/L (ref 136–145)
TRIGL SERPL-MCNC: 162 MG/DL (ref 37–140)
TSH SERPL-ACNC: 1.61 UIU/ML (ref 0.35–4.94)
VLDLC SERPL CALC-MCNC: 32 MG/DL
WBC # BLD AUTO: 6.87 X10(3)/MCL (ref 4.5–11.5)

## 2024-07-24 PROCEDURE — 85025 COMPLETE CBC W/AUTO DIFF WBC: CPT

## 2024-07-24 PROCEDURE — 82570 ASSAY OF URINE CREATININE: CPT

## 2024-07-24 PROCEDURE — 82043 UR ALBUMIN QUANTITATIVE: CPT

## 2024-07-24 PROCEDURE — 84443 ASSAY THYROID STIM HORMONE: CPT

## 2024-07-24 PROCEDURE — 80053 COMPREHEN METABOLIC PANEL: CPT

## 2024-07-24 PROCEDURE — 80061 LIPID PANEL: CPT

## 2024-07-24 PROCEDURE — 83036 HEMOGLOBIN GLYCOSYLATED A1C: CPT

## 2024-07-24 PROCEDURE — 36415 COLL VENOUS BLD VENIPUNCTURE: CPT

## 2024-07-26 ENCOUNTER — OFFICE VISIT (OUTPATIENT)
Dept: FAMILY MEDICINE | Facility: CLINIC | Age: 69
End: 2024-07-26
Payer: MEDICARE

## 2024-07-26 VITALS
DIASTOLIC BLOOD PRESSURE: 70 MMHG | WEIGHT: 213.88 LBS | TEMPERATURE: 98 F | BODY MASS INDEX: 36.51 KG/M2 | RESPIRATION RATE: 18 BRPM | SYSTOLIC BLOOD PRESSURE: 113 MMHG | HEIGHT: 64 IN | OXYGEN SATURATION: 97 % | HEART RATE: 73 BPM

## 2024-07-26 DIAGNOSIS — E11.65 TYPE 2 DIABETES MELLITUS WITH HYPERGLYCEMIA, WITHOUT LONG-TERM CURRENT USE OF INSULIN: ICD-10-CM

## 2024-07-26 DIAGNOSIS — I15.2 HYPERTENSION ASSOCIATED WITH DIABETES: ICD-10-CM

## 2024-07-26 DIAGNOSIS — E11.59 HYPERTENSION ASSOCIATED WITH DIABETES: ICD-10-CM

## 2024-07-26 DIAGNOSIS — E55.9 VITAMIN D DEFICIENCY: ICD-10-CM

## 2024-07-26 DIAGNOSIS — L30.4 INTERTRIGO: ICD-10-CM

## 2024-07-26 DIAGNOSIS — Z00.00 WELLNESS EXAMINATION: Primary | ICD-10-CM

## 2024-07-26 DIAGNOSIS — E66.01 SEVERE OBESITY (BMI 35.0-39.9) WITH COMORBIDITY: ICD-10-CM

## 2024-07-26 DIAGNOSIS — M10.9 GOUT, UNSPECIFIED CAUSE, UNSPECIFIED CHRONICITY, UNSPECIFIED SITE: ICD-10-CM

## 2024-07-26 DIAGNOSIS — I12.9 BENIGN HYPERTENSIVE KIDNEY DISEASE WITH CHRONIC KIDNEY DISEASE STAGE I THROUGH STAGE IV, OR UNSPECIFIED: ICD-10-CM

## 2024-07-26 DIAGNOSIS — E11.69 HYPERLIPIDEMIA ASSOCIATED WITH TYPE 2 DIABETES MELLITUS: ICD-10-CM

## 2024-07-26 DIAGNOSIS — E78.5 HYPERLIPIDEMIA ASSOCIATED WITH TYPE 2 DIABETES MELLITUS: ICD-10-CM

## 2024-07-26 DIAGNOSIS — N18.32 STAGE 3B CHRONIC KIDNEY DISEASE: ICD-10-CM

## 2024-07-26 RX ORDER — NYSTATIN AND TRIAMCINOLONE ACETONIDE 100000; 1 [USP'U]/G; MG/G
CREAM TOPICAL 4 TIMES DAILY
Qty: 30 G | Refills: 6 | Status: SHIPPED | OUTPATIENT
Start: 2024-07-26

## 2024-07-26 NOTE — ASSESSMENT & PLAN NOTE
Stable  Continue current meds (Losartan, Amlodipine, and Farxiga)  Keep appt with PCP for follow up

## 2024-07-26 NOTE — ASSESSMENT & PLAN NOTE
BP at goal  Continue current medication (losartan and norvasc)  Daily BP check; bring log all clinic visits  Instructed to report to ED for any BP greater than 200/100, dizziness, syncope, CP, or SOB  Keep appt. With PCP for follow up  Patient is agreeable to plan and verbalizes understanding

## 2024-07-26 NOTE — PROGRESS NOTES
Patient ID: [id number]     Chief Complaint: Medicare Wellness      HPI:     Shakila Velazquez is a 68 y.o. female here today for a Medicare Wellness. Denies any acute problems.      Opioid Screening: Patient medication list reviewed, patient is not taking prescription opioids. Patient is not using additional opioids than prescribed. Patient is at low risk of substance abuse based on this opioid use history.       -------------------------------------    Gout    Hyperlipidemia associated with type 2 diabetes mellitus    Hypertension associated with diabetes    Osteoarthritis of knee    Severe obesity (BMI 35.0-39.9) with comorbidity    Stage 3b chronic kidney disease    Last Assessment & Plan:   Formatting of this note might be different from the original.  Renal indices remain stable at baseline.    Type 2 diabetes mellitus    Vitamin D deficiency        Past Surgical History:   Procedure Laterality Date    HYSTERECTOMY  1986    TUBAL LIGATION  1985       Review of patient's allergies indicates:   Allergen Reactions    Sulfa (sulfonamide antibiotics) Hives and Itching       Outpatient Medications Marked as Taking for the 7/26/24 encounter (Office Visit) with Suzi Ellison NP   Medication Sig Dispense Refill    amLODIPine (NORVASC) 10 MG tablet Take 1 tablet (10 mg total) by mouth once daily. 90 tablet 3    b complex vitamins capsule Take 1 capsule by mouth once daily.      FARXIGA 10 mg tablet Take 10 mg by mouth once daily.      ferrous sulfate (FEOSOL) 325 mg (65 mg iron) Tab tablet Take 325 mg by mouth.      losartan (COZAAR) 50 MG tablet Take 1 tablet (50 mg total) by mouth once daily. 90 tablet 3    niacin 500 mg TbSR       omega-3s/dha/epa/fish oil/D3 (VITAMIN-D + OMEGA-3 ORAL) Take by mouth.      pravastatin (PRAVACHOL) 40 MG tablet Take 1 tablet (40 mg total) by mouth every evening. 90 tablet 3    tirzepatide 7.5 mg/0.5 mL PnIj Inject 7.5 mg into the skin every 7 days. 6 mL 3    UNABLE TO FIND BIEST  1.75MG/ML CREAM(LAF)  APPLY 0.5-MLS(1/2 SYRINGEFUL) TOPICALLY TO INNER ARMS/INNER THIGHS EVERY NIGHT AT BEDTIME AND MASSAGE IN...ROTATE SITES each time.    Please dispense 90 day supply 1 Syringe 3    [DISCONTINUED] medical supply, miscellaneous (MISCELLANEOUS CREAMS TOP) Apply 0.5 mLs topically every evening. Biest 1.75 mg/ml      [DISCONTINUED] miscellaneous medical supply Kit Biest 1.75 mg/ml, Apply 0.5 mLs topically every evening 1 kit 3    [DISCONTINUED] UNABLE TO FIND BIEST 1.75MG/ML CREAM(LAF)         Social History     Socioeconomic History    Marital status:    Tobacco Use    Smoking status: Never    Smokeless tobacco: Never   Substance and Sexual Activity    Alcohol use: Never    Drug use: Never    Sexual activity: Yes     Birth control/protection: None     Social Determinants of Health     Financial Resource Strain: Low Risk  (1/10/2024)    Overall Financial Resource Strain (CARDIA)     Difficulty of Paying Living Expenses: Not hard at all   Food Insecurity: No Food Insecurity (1/10/2024)    Hunger Vital Sign     Worried About Running Out of Food in the Last Year: Never true     Ran Out of Food in the Last Year: Never true   Transportation Needs: No Transportation Needs (1/10/2024)    PRAPARE - Transportation     Lack of Transportation (Medical): No     Lack of Transportation (Non-Medical): No   Physical Activity: Sufficiently Active (1/10/2024)    Exercise Vital Sign     Days of Exercise per Week: 5 days     Minutes of Exercise per Session: 70 min   Stress: No Stress Concern Present (1/10/2024)    [employer] [place] [employer] - Occupational Stress Questionnaire     Feeling of Stress : Not at all   Housing Stability: Low Risk  (1/10/2024)    Housing Stability Vital Sign     Unable to Pay for Housing in the Last Year: No     Number of Places Lived in the Last Year: 1     Unstable Housing in the Last Year: No        Family History   Problem Relation Name Age of Onset    Cancer Mother  Carol Swain     Cancer Sister Keri Swain     Cancer Daughter Prateek Hong     Diabetes Paternal Aunt Saloni Swain     Cancer Maternal Grandmother Dania Wilturner     Cancer Maternal Grandfather Arjun Wilturner     Cancer Paternal Grandmother [place] Swain     Cancer Paternal Grandfather Timmy Swain         Patient Care Team:  Regi Arizmendi MD as PCP - General (Family Medicine)  Maurice Cadet MD as Consulting Physician (Gastroenterology)  Juan José Murrieta MD as Consulting Physician (Ophthalmology)  Denita Tse MD as Consulting Physician (Rheumatology)  Oral De MD as Consulting Physician (Nephrology)       Subjective:     Review of Systems   All other systems reviewed and are negative.        Patient Reported Health Risk Assessment  What [age] 65-69  Are you male or female?: Female  During the past four weeks, how much have you been bothered by emotional problems such as feeling anxious, depressed, irritable, sad, or downhearted and blue?: Not at all  During the past five weeks, has your physical and/or emotional health limited your social activities with family, friends, neighbors, or groups?: Not at all  During the past four weeks, how much bodily pain have you generally had?: No pain  During the past four weeks, was someone available to help if you needed and wanted help?: Yes, as much as I wanted  During the past four weeks, what was the hardest physical activity you could do for at least two minutes?: Moderate  Can you get to places out of walking distance without help?  (For example, can you travel alone on buses or taxis, or drive your own car?): Yes  Can you go shopping for groceries or clothes without someone's help?: Yes  Can you prepare your own meals?: Yes  Can you do your own housework without help?: Yes  Because of any health problems, do you need the help of another person with your personal care needs such as eating, bathing, dressing, or getting  "around the house?: No  Can you handle your own money without help?: Yes  During the past four weeks, how would you rate your health in general?: Good  How have things been going for you during the past four weeks?: Pretty well  Are you having difficulties driving your car?: No  Do you always fasten your seat belt when you are in a car?: Yes, usually  How often in the past four weeks have you been bothered by falling or dizzy when standing up?: Never  How often in the past four weeks have you been bothered by trouble eating well?: Never  How often in the past four weeks have you been bothered by teeth or denture problems?: Never  How often in the past four weeks have you been bothered with problems using the telephone?: Never  How often in the past four weeks have you been bothered by tiredness or fatigue?: Seldom  Have you fallen two or more times in the past year?: No  Are you afraid of falling?: No  Are you a smoker?: No  During the past four weeks, how many drinks of wine, beer, or other alcoholic beverages did you have?: No alcohol at all  Do you exercise for about 20 minutes three or more days a week?: No, I usually do not exercise this much  Have you been given any information to help you with hazards in your house that might hurt you?: No  Have you been given any information to help you with keeping track of your medications?: No  How often do you have trouble taking medicines the way you've been told to take them?: I always take them as prescribed  How confident are you that you can control and manage most of your health problems?: Very confident  What is your race? (Check all that apply.):     Objective:     /70 (BP Location: Right arm, Patient Position: Sitting, BP Method: Large (Automatic))   Pulse 73   Temp 97.5 °F (36.4 °C) (Oral)   Resp 18   Ht 5' 4" (1.626 m)   Wt 97 kg (213 lb 14.4 oz)   LMP  (LMP Unknown)   SpO2 97%   BMI 36.72 kg/m²     Physical Exam  Vitals reviewed. "   Constitutional:       Appearance: Normal appearance.   HENT:      Head: Normocephalic.      Mouth/Throat:      Mouth: Mucous membranes are moist.   Eyes:      Conjunctiva/sclera: Conjunctivae normal.      Pupils: Pupils are equal, round, and reactive to light.   Cardiovascular:      Rate and Rhythm: Normal rate and regular rhythm.   Pulmonary:      Effort: Pulmonary effort is normal. No respiratory distress.      Breath sounds: Normal breath sounds.   Abdominal:      General: Bowel sounds are normal. There is no distension.      Palpations: Abdomen is soft.      Tenderness: There is no abdominal tenderness.   Musculoskeletal:         General: Normal range of motion.      Cervical back: Normal range of motion and neck supple.   Skin:     General: Skin is warm and dry.   Neurological:      Mental Status: She is alert and oriented to person, place, and time.   Psychiatric:         Mood and Affect: Mood normal.                No data to display                  7/26/2024    10:00 AM 5/27/2024    11:15 AM 3/20/2024     2:20 PM 2/17/2024    11:20 AM 2/14/2024     9:20 AM 1/17/2024     3:00 PM 7/25/2023    10:45 AM   Fall Risk Assessment - Outpatient   Mobility Status Ambulatory Ambulatory Ambulatory Ambulatory Ambulatory Ambulatory Ambulatory   Number of falls 0 0 0 0 0 0 0   Identified as fall risk False False False False False False False              Assessment/Plan:     1. Wellness examination  Assessment & Plan:  Health Maintenance up to date  RSV and Covid-19 vaccines due; declines        2. Type 2 diabetes mellitus with hyperglycemia, without long-term current use of insulin  Assessment & Plan:  Stable; A1C 5.8  Continue current meds (Farxiga, Mounjaro)  Keep appt with PCP for follow up        3. Hypertension associated with diabetes  Assessment & Plan:  BP at goal  Continue current medication (losartan and norvasc)  Daily BP check; bring log all clinic visits  Instructed to report to ED for any BP greater than  200/100, dizziness, syncope, CP, or SOB  Keep appt. With PCP for follow up  Patient is agreeable to plan and verbalizes understanding        4. Hyperlipidemia associated with type 2 diabetes mellitus  Assessment & Plan:  Stable  Continue Statin as prescribed  Keep appt with PCP for follow up        5. Stage 3b chronic kidney disease  Overview:  Last Assessment & Plan:   Formatting of this note might be different from the original.  Renal indices remain stable at baseline.    Assessment & Plan:  Stable; GFR 35  Continue current meds (Farxiga)  Keep appt with Nephrology for follow up        6. Vitamin D deficiency  Assessment & Plan:  Stable  Continue OTC Vitamin D  Keep appt with PCP for follow up        7. Severe obesity (BMI 35.0-39.9) with comorbidity  Assessment & Plan:  Educated on aerobic exercises      8. Intertrigo  -     nystatin-triamcinolone (MYCOLOG II) cream; Apply topically 4 (four) times daily.  Dispense: 30 g; Refill: 6    9. Gout, unspecified cause, unspecified chronicity, unspecified site  Assessment & Plan:  Stable  Keep appt with PCP for follow up        10. Benign hypertensive kidney disease with chronic kidney disease stage I through stage IV, or unspecified  Overview:  Last Assessment & Plan:   Formatting of this note might be different from the original.  BP significantly improved.  Continue current regimen.  Continue home BP monitoring.    Assessment & Plan:  Stable  Continue current meds (Losartan, Amlodipine, and Farxiga)  Keep appt with PCP for follow up               Medicare Annual Wellness and Personalized Prevention Plan:   Fall Risk + Home Safety + Hearing Impairment + Depression Screen + Opioid and Substance Abuse Screening + Cognitive Impairment Screen + Health Risk Assessment all reviewed.     Health Maintenance Topics with due status: Not Due       Topic Last Completion Date    TETANUS VACCINE 08/01/2014    DEXA Scan 08/17/2022    Colorectal Cancer Screening 09/21/2022     Influenza Vaccine 09/16/2023    Eye Exam 11/21/2023    Lipid Panel 07/24/2024    Hemoglobin A1c 07/24/2024      The patient's Health Maintenance was reviewed and the following appears to be due at this time:   Health Maintenance Due   Topic Date Due    RSV Vaccine (Age 60+ and Pregnant patients) (1 - 1-dose 60+ series) Never done    COVID-19 Vaccine (14 - 2023-24 season) 11/21/2023    Foot Exam  01/19/2024    Mammogram  08/22/2024       Advance Care Planning   I attest to discussing Advance Care Planning with patient and/or family member.  Education was provided including the importance of the Health Care Power of , Advance Directives, and/or [place] documentation.  The patient expressed understanding to the importance of this information and discussion.         Follow up in about 6 months (around 1/26/2025) for F/U with PCP; chronic illnesses . In addition to their scheduled follow up, the patient has also been instructed to follow up on as needed basis.

## 2024-08-22 ENCOUNTER — HOSPITAL ENCOUNTER (OUTPATIENT)
Dept: RADIOLOGY | Facility: HOSPITAL | Age: 69
Discharge: HOME | End: 2024-08-22
Attending: FAMILY MEDICINE
Payer: MEDICARE

## 2024-08-22 DIAGNOSIS — Z12.31 BREAST CANCER SCREENING BY MAMMOGRAM: ICD-10-CM

## 2024-08-22 DIAGNOSIS — Z78.0 POSTMENOPAUSAL: ICD-10-CM

## 2024-08-22 PROCEDURE — 77063 BREAST TOMOSYNTHESIS BI: CPT | Mod: 26,,, | Performed by: RADIOLOGY

## 2024-08-22 PROCEDURE — 77067 SCR MAMMO BI INCL CAD: CPT | Mod: 26,,, | Performed by: RADIOLOGY

## 2024-08-22 PROCEDURE — 77080 DXA BONE DENSITY AXIAL: CPT | Mod: TC

## 2024-08-22 PROCEDURE — 77067 SCR MAMMO BI INCL CAD: CPT | Mod: TC

## 2024-10-25 ENCOUNTER — LAB VISIT (OUTPATIENT)
Dept: LAB | Facility: HOSPITAL | Age: 69
End: 2024-10-25
Attending: INTERNAL MEDICINE
Payer: MEDICARE

## 2024-10-25 DIAGNOSIS — N18.6 TYPE 2 DIABETES MELLITUS WITH END-STAGE RENAL DISEASE: ICD-10-CM

## 2024-10-25 DIAGNOSIS — E83.52 HYPERCALCEMIA: ICD-10-CM

## 2024-10-25 DIAGNOSIS — N18.32 CHRONIC KIDNEY DISEASE (CKD) STAGE G3B/A1, MODERATELY DECREASED GLOMERULAR FILTRATION RATE (GFR) BETWEEN 30-44 ML/MIN/1.73 SQUARE METER AND ALBUMINURIA CREATININE RATIO LESS THAN 30 MG/G: Primary | ICD-10-CM

## 2024-10-25 DIAGNOSIS — I12.9 PARENCHYMAL RENAL HYPERTENSION: ICD-10-CM

## 2024-10-25 DIAGNOSIS — E11.22 TYPE 2 DIABETES MELLITUS WITH END-STAGE RENAL DISEASE: ICD-10-CM

## 2024-10-25 LAB
ALBUMIN SERPL-MCNC: 4 G/DL (ref 3.4–4.8)
ALBUMIN/GLOB SERPL: 1.2 RATIO (ref 1.1–2)
ALP SERPL-CCNC: 61 UNIT/L (ref 40–150)
ALT SERPL-CCNC: 12 UNIT/L (ref 0–55)
ANION GAP SERPL CALC-SCNC: 8 MEQ/L
AST SERPL-CCNC: 14 UNIT/L (ref 5–34)
BASOPHILS # BLD AUTO: 0.04 X10(3)/MCL
BASOPHILS NFR BLD AUTO: 0.6 %
BILIRUB SERPL-MCNC: 0.5 MG/DL
BUN SERPL-MCNC: 24.4 MG/DL (ref 9.8–20.1)
CALCIUM SERPL-MCNC: 10.3 MG/DL (ref 8.4–10.2)
CHLORIDE SERPL-SCNC: 104 MMOL/L (ref 98–107)
CO2 SERPL-SCNC: 25 MMOL/L (ref 23–31)
CREAT SERPL-MCNC: 1.66 MG/DL (ref 0.55–1.02)
CREAT/UREA NIT SERPL: 15
EOSINOPHIL # BLD AUTO: 0.06 X10(3)/MCL (ref 0–0.9)
EOSINOPHIL NFR BLD AUTO: 0.8 %
ERYTHROCYTE [DISTWIDTH] IN BLOOD BY AUTOMATED COUNT: 12.8 % (ref 11.5–17)
GFR SERPLBLD CREATININE-BSD FMLA CKD-EPI: 33 ML/MIN/1.73/M2
GLOBULIN SER-MCNC: 3.4 GM/DL (ref 2.4–3.5)
GLUCOSE SERPL-MCNC: 114 MG/DL (ref 82–115)
HCT VFR BLD AUTO: 37.4 % (ref 37–47)
HGB BLD-MCNC: 12.2 G/DL (ref 12–16)
IMM GRANULOCYTES # BLD AUTO: 0.02 X10(3)/MCL (ref 0–0.04)
IMM GRANULOCYTES NFR BLD AUTO: 0.3 %
LYMPHOCYTES # BLD AUTO: 1.77 X10(3)/MCL (ref 0.6–4.6)
LYMPHOCYTES NFR BLD AUTO: 24.8 %
MAGNESIUM SERPL-MCNC: 2.1 MG/DL (ref 1.6–2.6)
MCH RBC QN AUTO: 31 PG (ref 27–31)
MCHC RBC AUTO-ENTMCNC: 32.6 G/DL (ref 33–36)
MCV RBC AUTO: 94.9 FL (ref 80–94)
MONOCYTES # BLD AUTO: 0.43 X10(3)/MCL (ref 0.1–1.3)
MONOCYTES NFR BLD AUTO: 6 %
NEUTROPHILS # BLD AUTO: 4.82 X10(3)/MCL (ref 2.1–9.2)
NEUTROPHILS NFR BLD AUTO: 67.5 %
NRBC BLD AUTO-RTO: 0 %
PHOSPHATE SERPL-MCNC: 3.4 MG/DL (ref 2.3–4.7)
PLATELET # BLD AUTO: 240 X10(3)/MCL (ref 130–400)
PMV BLD AUTO: 10.6 FL (ref 7.4–10.4)
POTASSIUM SERPL-SCNC: 4.2 MMOL/L (ref 3.5–5.1)
PROT SERPL-MCNC: 7.4 GM/DL (ref 5.8–7.6)
PTH-INTACT SERPL-MCNC: 49.6 PG/ML (ref 8.7–77)
RBC # BLD AUTO: 3.94 X10(6)/MCL (ref 4.2–5.4)
SODIUM SERPL-SCNC: 137 MMOL/L (ref 136–145)
WBC # BLD AUTO: 7.14 X10(3)/MCL (ref 4.5–11.5)

## 2024-10-25 PROCEDURE — 85025 COMPLETE CBC W/AUTO DIFF WBC: CPT

## 2024-10-25 PROCEDURE — 83735 ASSAY OF MAGNESIUM: CPT

## 2024-10-25 PROCEDURE — 84100 ASSAY OF PHOSPHORUS: CPT

## 2024-10-25 PROCEDURE — 83970 ASSAY OF PARATHORMONE: CPT

## 2024-10-25 PROCEDURE — 80053 COMPREHEN METABOLIC PANEL: CPT

## 2024-10-25 PROCEDURE — 36415 COLL VENOUS BLD VENIPUNCTURE: CPT

## 2024-10-28 PROBLEM — Z00.00 WELLNESS EXAMINATION: Status: RESOLVED | Noted: 2024-07-26 | Resolved: 2024-10-28

## 2024-12-11 LAB
LEFT EYE DM RETINOPATHY: NEGATIVE
RIGHT EYE DM RETINOPATHY: NEGATIVE

## 2025-01-14 DIAGNOSIS — I15.2 HYPERTENSION ASSOCIATED WITH DIABETES: ICD-10-CM

## 2025-01-14 DIAGNOSIS — E11.59 HYPERTENSION ASSOCIATED WITH DIABETES: ICD-10-CM

## 2025-01-14 RX ORDER — LOSARTAN POTASSIUM 50 MG/1
50 TABLET ORAL DAILY
Qty: 90 TABLET | Refills: 3 | Status: SHIPPED | OUTPATIENT
Start: 2025-01-14 | End: 2026-01-14

## 2025-01-28 ENCOUNTER — TELEPHONE (OUTPATIENT)
Dept: FAMILY MEDICINE | Facility: CLINIC | Age: 70
End: 2025-01-28

## 2025-01-28 ENCOUNTER — OFFICE VISIT (OUTPATIENT)
Dept: FAMILY MEDICINE | Facility: CLINIC | Age: 70
End: 2025-01-28
Payer: MEDICARE

## 2025-01-28 VITALS
HEART RATE: 83 BPM | BODY MASS INDEX: 35.12 KG/M2 | DIASTOLIC BLOOD PRESSURE: 72 MMHG | RESPIRATION RATE: 16 BRPM | WEIGHT: 205.69 LBS | HEIGHT: 64 IN | TEMPERATURE: 98 F | SYSTOLIC BLOOD PRESSURE: 126 MMHG | OXYGEN SATURATION: 98 %

## 2025-01-28 DIAGNOSIS — M10.9 GOUT, UNSPECIFIED CAUSE, UNSPECIFIED CHRONICITY, UNSPECIFIED SITE: ICD-10-CM

## 2025-01-28 DIAGNOSIS — E55.9 VITAMIN D DEFICIENCY: ICD-10-CM

## 2025-01-28 DIAGNOSIS — Z00.00 WELLNESS EXAMINATION: ICD-10-CM

## 2025-01-28 DIAGNOSIS — E11.69 HYPERLIPIDEMIA ASSOCIATED WITH TYPE 2 DIABETES MELLITUS: ICD-10-CM

## 2025-01-28 DIAGNOSIS — E78.5 HYPERLIPIDEMIA ASSOCIATED WITH TYPE 2 DIABETES MELLITUS: ICD-10-CM

## 2025-01-28 DIAGNOSIS — E11.59 HYPERTENSION ASSOCIATED WITH DIABETES: ICD-10-CM

## 2025-01-28 DIAGNOSIS — I15.2 HYPERTENSION ASSOCIATED WITH DIABETES: ICD-10-CM

## 2025-01-28 DIAGNOSIS — N18.32 STAGE 3B CHRONIC KIDNEY DISEASE: ICD-10-CM

## 2025-01-28 DIAGNOSIS — E11.65 TYPE 2 DIABETES MELLITUS WITH HYPERGLYCEMIA, WITHOUT LONG-TERM CURRENT USE OF INSULIN: Primary | ICD-10-CM

## 2025-01-28 DIAGNOSIS — E66.01 SEVERE OBESITY (BMI 35.0-39.9) WITH COMORBIDITY: ICD-10-CM

## 2025-01-28 DIAGNOSIS — Z79.890 POSTMENOPAUSAL HORMONE THERAPY: ICD-10-CM

## 2025-01-28 PROBLEM — J06.9 URI (UPPER RESPIRATORY INFECTION): Status: RESOLVED | Noted: 2024-05-27 | Resolved: 2025-01-28

## 2025-01-28 PROCEDURE — 99214 OFFICE O/P EST MOD 30 MIN: CPT | Mod: ,,, | Performed by: FAMILY MEDICINE

## 2025-01-28 RX ORDER — AMLODIPINE BESYLATE 10 MG/1
10 TABLET ORAL DAILY
Qty: 90 TABLET | Refills: 3 | Status: SHIPPED | OUTPATIENT
Start: 2025-01-28 | End: 2026-01-28

## 2025-01-28 RX ORDER — PRAVASTATIN SODIUM 40 MG/1
40 TABLET ORAL NIGHTLY
Qty: 90 TABLET | Refills: 3 | Status: SHIPPED | OUTPATIENT
Start: 2025-01-28 | End: 2026-01-28

## 2025-01-28 NOTE — ASSESSMENT & PLAN NOTE
Lab Results   Component Value Date    HGBA1C 5.8 07/24/2024     Urine micro 7/2024  Foot exam today  Eye exam with dr. Murrieta. Will request      On statin and arb. On mounjaro 7.5mg and farxiga

## 2025-01-28 NOTE — PROGRESS NOTES
Subjective:        Patient ID: Shakila Velazquez is a 69 y.o. female.    Chief Complaint: Follow-up (6 month DM follow up )      Very pleasant patient presents to clinic unaccompanied for follow up of chronic conditions.   She is due for her wellness visit in July         Wellness labs 7/2022 showed concern for gout.  Will work on dietary changes prior to starting meds.  No issues of late     Has CKD3 .  seeing renal- dr. multani.  Drinking more water.  Saw him 10/2024 with labs.  started on oral iron 2x/week.  They had her decrease her losartan dosing by 1/2.   She has follow up in 6 months.      she has htn and hld and is on norvasc and losartan. on asa. zetia did not work. started niacin mid 8/2021. Tolerating pravastatin 20mg daily.      she has diabetes. she is on mounjaro 7.5mg weekly and farxiga.   last a1c was 5.8 7/2024. Foot exam today. eye exams with dr. Betts 1/2025. Will request.  last urine micro 7/2024.     Her mobility form completed was completed 4/2019 due to OA of knees.  She has appt with dr. Tse 7/18/23     She is obese. has been on adipex in the past but not currently.     She had a mammogram in 8/2024.  dexa done 8/2022 and 8/2024 showed osteopenia. Had colonoscopy with Dr. Cadet 9/2022. had a complete hysterectomy for noncancerous reasons and therefore no longer does pap smears.  She is on bi:est compounded cream.      She is ALLERGIC to sulfa drugs. She drinks alcohol rarely. She does not smoke.  completed covid series and booster. utd on pneumonia shots. completed shingrix series.                 Review of Systems   Constitutional: Negative.    HENT: Negative.     Respiratory: Negative.     Cardiovascular: Negative.    Gastrointestinal: Negative.    Genitourinary: Negative.          Review of patient's allergies indicates:   Allergen Reactions    Sulfa (sulfonamide antibiotics) Hives and Itching      Vitals:    01/28/25 1053   BP: 126/72   BP Location: Left arm   Patient Position: Sitting  "  Pulse: 83   Resp: 16   Temp: 98.2 °F (36.8 °C)   TempSrc: Temporal   SpO2: 98%   Weight: 93.3 kg (205 lb 11.2 oz)   Height: 5' 4" (1.626 m)      Social History     Socioeconomic History    Marital status:    Tobacco Use    Smoking status: Never    Smokeless tobacco: Never   Substance and Sexual Activity    Alcohol use: Never    Drug use: Never    Sexual activity: Yes     Partners: Female     Birth control/protection: None     Social Drivers of Health     Financial Resource Strain: Low Risk  (1/10/2024)    Overall Financial Resource Strain (CARDIA)     Difficulty of Paying Living Expenses: Not hard at all   Food Insecurity: No Food Insecurity (1/10/2024)    Hunger Vital Sign     Worried About Running Out of Food in the Last Year: Never true     Ran Out of Food in the Last Year: Never true   Transportation Needs: No Transportation Needs (1/10/2024)    PRAPARE - Transportation     Lack of Transportation (Medical): No     Lack of Transportation (Non-Medical): No   Physical Activity: Sufficiently Active (1/10/2024)    Exercise Vital Sign     Days of Exercise per Week: 5 days     Minutes of Exercise per Session: 70 min   Stress: No Stress Concern Present (1/10/2024)    [employer] - Occupational Stress Questionnaire     Feeling of Stress : Not at all   Housing Stability: Low Risk  (1/10/2024)    Housing Stability Vital Sign     Unable to Pay for Housing in the Last Year: No     Number of Places Lived in the Last Year: 1     Unstable Housing in the Last Year: No      Family History   Problem Relation Name Age of Onset    Cancer Mother Carol Swain     Cancer Sister Keri Swain     Cancer Daughter Prateek Velazquez Hong     Diabetes Paternal Aunt Velta Swain     Cancer Maternal Grandmother Dania Wilturner     Cancer Maternal Grandfather Arjun Wilturner     Cancer Paternal Grandmother [place] Swain     Cancer Paternal Grandfather Timmy Swain           Objective: "     Physical Exam  Vitals and nursing note reviewed.   Constitutional:       Appearance: Normal appearance. She is obese.   HENT:      Head: Normocephalic and atraumatic.      Nose: Nose normal.      Mouth/Throat:      Mouth: Mucous membranes are moist.      Pharynx: Oropharynx is clear.   Eyes:      Extraocular Movements: Extraocular movements intact.   Cardiovascular:      Rate and Rhythm: Normal rate and regular rhythm.      Pulses: Normal pulses.           Dorsalis pedis pulses are 2+ on the right side and 2+ on the left side.        Posterior tibial pulses are 2+ on the right side and 2+ on the left side.      Heart sounds: Normal heart sounds.   Pulmonary:      Effort: Pulmonary effort is normal.      Breath sounds: Normal breath sounds.   Musculoskeletal:         General: Normal range of motion.      Cervical back: Normal range of motion.        Feet:    Feet:      Right foot:      Protective Sensation: 8 sites tested.  8 sites sensed.      Skin integrity: Skin integrity normal.      Left foot:      Protective Sensation: 8 sites tested.  8 sites sensed.      Skin integrity: Skin integrity normal.   Skin:     General: Skin is warm and dry.   Neurological:      General: No focal deficit present.      Mental Status: She is alert and oriented to person, place, and time. Mental status is at baseline.   Psychiatric:         Mood and Affect: Mood normal.       Current Outpatient Medications on File Prior to Visit   Medication Sig Dispense Refill    b complex vitamins capsule Take 1 capsule by mouth once daily.      FARXIGA 10 mg tablet Take 10 mg by mouth once daily.      ferrous sulfate (FEOSOL) 325 mg (65 mg iron) Tab tablet Take 325 mg by mouth.      losartan (COZAAR) 50 MG tablet Take 1 tablet (50 mg total) by mouth once daily. 90 tablet 3    niacin 500 mg TbSR       nystatin-triamcinolone (MYCOLOG II) cream Apply topically 4 (four) times daily. 30 g 6    omega-3s/dha/epa/fish oil/D3 (VITAMIN-D + OMEGA-3 ORAL)  Take by mouth.      tirzepatide 7.5 mg/0.5 mL PnIj Inject 7.5 mg into the skin every 7 days. 6 mL 3    [DISCONTINUED] amLODIPine (NORVASC) 10 MG tablet Take 1 tablet (10 mg total) by mouth once daily. 90 tablet 3    [DISCONTINUED] pravastatin (PRAVACHOL) 40 MG tablet Take 1 tablet (40 mg total) by mouth every evening. 90 tablet 3    [DISCONTINUED] UNABLE TO FIND BIEST 1.75MG/ML CREAM(LAF)  APPLY 0.5-MLS(1/2 SYRINGEFUL) TOPICALLY TO INNER ARMS/INNER THIGHS EVERY NIGHT AT BEDTIME AND MASSAGE IN...ROTATE SITES each time.    Please dispense 90 day supply 1 Syringe 3    [DISCONTINUED] UNABLE TO FIND BIEST 1.75MG/ML CREAM(LAF)       No current facility-administered medications on file prior to visit.     Health Maintenance   Topic Date Due    Diabetic Eye Exam  11/21/2024    Hemoglobin A1c  01/24/2025    RSV Vaccine (Age 60+ and Pregnant patients) (1 - Risk 60-74 years 1-dose series) 01/28/2025 (Originally 8/25/2014)    TETANUS VACCINE  01/28/2026 (Originally 8/1/2024)    Lipid Panel  07/24/2025    Mammogram  08/22/2025    Foot Exam  01/28/2026    DEXA Scan  08/22/2027    Colorectal Cancer Screening  09/21/2032    Hepatitis C Screening  Completed    Shingles Vaccine  Completed    Influenza Vaccine  Completed    COVID-19 Vaccine  Completed    Pneumococcal Vaccines (Age 50+)  Completed      Results for orders placed or performed in visit on 10/25/24   Comprehensive Metabolic Panel    Collection Time: 10/25/24 11:15 AM   Result Value Ref Range    Sodium 137 136 - 145 mmol/L    Potassium 4.2 3.5 - 5.1 mmol/L    Chloride 104 98 - 107 mmol/L    CO2 25 23 - 31 mmol/L    Glucose 114 82 - 115 mg/dL    Blood Urea Nitrogen 24.4 (H) 9.8 - 20.1 mg/dL    Creatinine 1.66 (H) 0.55 - 1.02 mg/dL    Calcium 10.3 (H) 8.4 - 10.2 mg/dL    Protein Total 7.4 5.8 - 7.6 gm/dL    Albumin 4.0 3.4 - 4.8 g/dL    Globulin 3.4 2.4 - 3.5 gm/dL    Albumin/Globulin Ratio 1.2 1.1 - 2.0 ratio    Bilirubin Total 0.5 <=1.5 mg/dL    ALP 61 40 - 150 unit/L     ALT 12 0 - 55 unit/L    AST 14 5 - 34 unit/L    eGFR 33 mL/min/1.73/m2    Anion Gap 8.0 mEq/L    BUN/Creatinine Ratio 15    Magnesium    Collection Time: 10/25/24 11:15 AM   Result Value Ref Range    Magnesium Level 2.10 1.60 - 2.60 mg/dL   Phosphorus    Collection Time: 10/25/24 11:15 AM   Result Value Ref Range    Phosphorus Level 3.4 2.3 - 4.7 mg/dL   PTH, Intact    Collection Time: 10/25/24 11:15 AM   Result Value Ref Range    PARATHYROID HORMONE INTACT 49.6 8.7 - 77.0 pg/mL   CBC with Differential    Collection Time: 10/25/24 11:15 AM   Result Value Ref Range    WBC 7.14 4.50 - 11.50 x10(3)/mcL    RBC 3.94 (L) 4.20 - 5.40 x10(6)/mcL    Hgb 12.2 12.0 - 16.0 g/dL    Hct 37.4 37.0 - 47.0 %    MCV 94.9 (H) 80.0 - 94.0 fL    MCH 31.0 27.0 - 31.0 pg    MCHC 32.6 (L) 33.0 - 36.0 g/dL    RDW 12.8 11.5 - 17.0 %    Platelet 240 130 - 400 x10(3)/mcL    MPV 10.6 (H) 7.4 - 10.4 fL    Neut % 67.5 %    Lymph % 24.8 %    Mono % 6.0 %    Eos % 0.8 %    Basophil % 0.6 %    Lymph # 1.77 0.6 - 4.6 x10(3)/mcL    Neut # 4.82 2.1 - 9.2 x10(3)/mcL    Mono # 0.43 0.1 - 1.3 x10(3)/mcL    Eos # 0.06 0 - 0.9 x10(3)/mcL    Baso # 0.04 <=0.2 x10(3)/mcL    Imm Gran # 0.02 0 - 0.04 x10(3)/mcL    Imm Grans % 0.3 %    NRBC% 0.0 %          Assessment & Plan:     Active Problem List with Overview Notes    Diagnosis Date Noted    Postmenopausal hormone therapy 01/28/2025    Hypertension associated with diabetes 01/19/2023    Postmenopausal 07/21/2022    Breast cancer screening by mammogram 07/21/2022    Vitamin D deficiency 07/21/2022    Type 2 diabetes mellitus 07/21/2022    Advanced care planning/counseling discussion 07/21/2022    Osteoarthritis of knee 07/21/2022    Gout 07/21/2022    Colon cancer screening 07/21/2022    Severe obesity (BMI 35.0-39.9) with comorbidity     Benign hypertensive kidney disease with chronic kidney disease 09/03/2021     Last Assessment & Plan:   Formatting of this note might be different from the original.  BP  significantly improved.  Continue current regimen.  Continue home BP monitoring.      Hyperlipidemia associated with type 2 diabetes mellitus 09/03/2021    Stage 3b chronic kidney disease 09/03/2021     Last Assessment & Plan:   Formatting of this note might be different from the original.  Renal indices remain stable at baseline.         1. Type 2 diabetes mellitus with hyperglycemia, without long-term current use of insulin  Assessment & Plan:  Lab Results   Component Value Date    HGBA1C 5.8 07/24/2024     Urine micro 7/2024  Foot exam today  Eye exam with dr. Murrieta. Will request      On statin and arb. On mounjaro 7.5mg and farxiga    Orders:  -     Diabetes Digital Medicine (DDMP) Enrollment Order  -     Comprehensive Metabolic Panel; Future; Expected date: 01/28/2025  -     Hemoglobin A1C; Future; Expected date: 01/28/2025  -     CBC Auto Differential; Future; Expected date: 07/28/2025  -     Comprehensive Metabolic Panel; Future; Expected date: 07/28/2025  -     Lipid Panel; Future; Expected date: 07/28/2025  -     TSH; Future; Expected date: 07/28/2025  -     Hemoglobin A1C; Future; Expected date: 07/28/2025  -     Urinalysis; Future; Expected date: 07/28/2025  -     Microalbumin/Creatinine Ratio, Urine; Future; Expected date: 07/28/2025  -     Uric Acid; Future; Expected date: 07/28/2025  -     Vitamin D; Future; Expected date: 07/28/2025    2. Hypertension associated with diabetes  Assessment & Plan:  Well controlled on current prescriptions.   Continue to monitor bp. Contact clinic for concerns. Patient is agreeable to plan and verbalized understanding    Orders:  -     Hypertension Digital Medicine (HDMP) Enrollment Order  -     Comprehensive Metabolic Panel; Future; Expected date: 01/28/2025  -     Hemoglobin A1C; Future; Expected date: 01/28/2025  -     CBC Auto Differential; Future; Expected date: 07/28/2025  -     Comprehensive Metabolic Panel; Future; Expected date: 07/28/2025  -     Lipid Panel;  "Future; Expected date: 07/28/2025  -     TSH; Future; Expected date: 07/28/2025  -     Hemoglobin A1C; Future; Expected date: 07/28/2025  -     Urinalysis; Future; Expected date: 07/28/2025  -     Microalbumin/Creatinine Ratio, Urine; Future; Expected date: 07/28/2025  -     Uric Acid; Future; Expected date: 07/28/2025  -     Vitamin D; Future; Expected date: 07/28/2025  -     amLODIPine (NORVASC) 10 MG tablet; Take 1 tablet (10 mg total) by mouth once daily.  Dispense: 90 tablet; Refill: 3    3. Hyperlipidemia associated with type 2 diabetes mellitus  Assessment & Plan:  On statin, niacin and fish oil. Did not tolerate zetia    Lab Results   Component Value Date    CHOL 209 (H) 07/24/2024    CHOL 221 (H) 07/24/2023    CHOL 235 (H) 07/20/2022     Lab Results   Component Value Date    HDL 42 07/24/2024    HDL 49 07/24/2023    HDL 44 07/20/2022     No results found for: "LDLCALC"  No results found for: "DLDL"  Lab Results   Component Value Date    TRIG 162 (H) 07/24/2024    TRIG 94 07/24/2023    TRIG 132 07/20/2022       f1 No results found for: "CHOLHDL"      Orders:  -     Comprehensive Metabolic Panel; Future; Expected date: 01/28/2025  -     Hemoglobin A1C; Future; Expected date: 01/28/2025  -     CBC Auto Differential; Future; Expected date: 07/28/2025  -     Comprehensive Metabolic Panel; Future; Expected date: 07/28/2025  -     Lipid Panel; Future; Expected date: 07/28/2025  -     TSH; Future; Expected date: 07/28/2025  -     Hemoglobin A1C; Future; Expected date: 07/28/2025  -     Urinalysis; Future; Expected date: 07/28/2025  -     Microalbumin/Creatinine Ratio, Urine; Future; Expected date: 07/28/2025  -     Uric Acid; Future; Expected date: 07/28/2025  -     Vitamin D; Future; Expected date: 07/28/2025  -     pravastatin (PRAVACHOL) 40 MG tablet; Take 1 tablet (40 mg total) by mouth every evening.  Dispense: 90 tablet; Refill: 3    4. Stage 3b chronic kidney disease  Overview:  Last Assessment & Plan: " "  Formatting of this note might be different from the original.  Renal indices remain stable at baseline.    Assessment & Plan:  Keep scheduled appointments with renal. Encourage fluids. On oral iron    Orders:  -     Comprehensive Metabolic Panel; Future; Expected date: 01/28/2025  -     Hemoglobin A1C; Future; Expected date: 01/28/2025  -     CBC Auto Differential; Future; Expected date: 07/28/2025  -     Comprehensive Metabolic Panel; Future; Expected date: 07/28/2025  -     Lipid Panel; Future; Expected date: 07/28/2025  -     TSH; Future; Expected date: 07/28/2025  -     Hemoglobin A1C; Future; Expected date: 07/28/2025  -     Urinalysis; Future; Expected date: 07/28/2025  -     Microalbumin/Creatinine Ratio, Urine; Future; Expected date: 07/28/2025  -     Uric Acid; Future; Expected date: 07/28/2025  -     Vitamin D; Future; Expected date: 07/28/2025    5. Vitamin D deficiency  Assessment & Plan:  Cont to supplement otc    Orders:  -     CBC Auto Differential; Future; Expected date: 07/28/2025  -     Comprehensive Metabolic Panel; Future; Expected date: 07/28/2025  -     Lipid Panel; Future; Expected date: 07/28/2025  -     TSH; Future; Expected date: 07/28/2025  -     Hemoglobin A1C; Future; Expected date: 07/28/2025  -     Urinalysis; Future; Expected date: 07/28/2025  -     Microalbumin/Creatinine Ratio, Urine; Future; Expected date: 07/28/2025  -     Uric Acid; Future; Expected date: 07/28/2025  -     Vitamin D; Future; Expected date: 07/28/2025    6. Gout, unspecified cause, unspecified chronicity, unspecified site  Assessment & Plan:  Will make dietary changes. Monitor symptoms. Contact clinic for concerns.     No results found for: "URICACID"  Uric acid 6.4 7/2022. Not on meds    Orders:  -     CBC Auto Differential; Future; Expected date: 07/28/2025  -     Comprehensive Metabolic Panel; Future; Expected date: 07/28/2025  -     Lipid Panel; Future; Expected date: 07/28/2025  -     TSH; Future; Expected " date: 07/28/2025  -     Hemoglobin A1C; Future; Expected date: 07/28/2025  -     Urinalysis; Future; Expected date: 07/28/2025  -     Microalbumin/Creatinine Ratio, Urine; Future; Expected date: 07/28/2025  -     Uric Acid; Future; Expected date: 07/28/2025  -     Vitamin D; Future; Expected date: 07/28/2025    7. Postmenopausal hormone therapy  Assessment & Plan:  On bi est compounded rx. Refill sent in as requested.     Orders:  -     Discontinue: UNABLE TO FIND; BIEST 1.75MG/ML CREAM(LAF)  APPLY 0.5-MLS(1/2 SYRINGEFUL) TOPICALLY TO INNER ARMS/INNER THIGHS EVERY NIGHT AT BEDTIME AND MASSAGE IN...ROTATE SITES each time.    Please dispense 90 day supply  Dispense: 1 Syringe; Refill: 3  -     UNABLE TO FIND; BIEST 1.75MG/ML CREAM(LAF)  APPLY 0.5-MLS(1/2 SYRINGEFUL) TOPICALLY TO INNER ARMS/INNER THIGHS EVERY NIGHT AT BEDTIME AND MASSAGE IN...ROTATE SITES each time.    Please dispense 90 day supply  Dispense: 1 Syringe; Refill: 3    8. Severe obesity (BMI 35.0-39.9) with comorbidity  Assessment & Plan:  Encouraged lifestyle change      9. Wellness examination  -     CBC Auto Differential; Future; Expected date: 07/28/2025  -     Comprehensive Metabolic Panel; Future; Expected date: 07/28/2025  -     Lipid Panel; Future; Expected date: 07/28/2025  -     TSH; Future; Expected date: 07/28/2025  -     Hemoglobin A1C; Future; Expected date: 07/28/2025  -     Urinalysis; Future; Expected date: 07/28/2025  -     Microalbumin/Creatinine Ratio, Urine; Future; Expected date: 07/28/2025  -     Uric Acid; Future; Expected date: 07/28/2025  -     Vitamin D; Future; Expected date: 07/28/2025         Follow up in about 6 months (around 7/28/2025) for Medicare Wellness with labs.

## 2025-01-28 NOTE — ASSESSMENT & PLAN NOTE
"Will make dietary changes. Monitor symptoms. Contact clinic for concerns.     No results found for: "URICACID"  Uric acid 6.4 7/2022. Not on meds  "

## 2025-01-29 ENCOUNTER — DOCUMENTATION ONLY (OUTPATIENT)
Dept: FAMILY MEDICINE | Facility: CLINIC | Age: 70
End: 2025-01-29
Payer: MEDICARE

## 2025-05-01 ENCOUNTER — RESULTS FOLLOW-UP (OUTPATIENT)
Dept: FAMILY MEDICINE | Facility: CLINIC | Age: 70
End: 2025-05-01

## 2025-05-01 ENCOUNTER — LAB VISIT (OUTPATIENT)
Dept: LAB | Facility: HOSPITAL | Age: 70
End: 2025-05-01
Attending: INTERNAL MEDICINE
Payer: MEDICARE

## 2025-05-01 ENCOUNTER — TELEPHONE (OUTPATIENT)
Dept: FAMILY MEDICINE | Facility: CLINIC | Age: 70
End: 2025-05-01
Payer: MEDICARE

## 2025-05-01 DIAGNOSIS — E11.22 TYPE 2 DIABETES MELLITUS WITH END-STAGE RENAL DISEASE: ICD-10-CM

## 2025-05-01 DIAGNOSIS — N18.32 CHRONIC KIDNEY DISEASE (CKD) STAGE G3B/A1, MODERATELY DECREASED GLOMERULAR FILTRATION RATE (GFR) BETWEEN 30-44 ML/MIN/1.73 SQUARE METER AND ALBUMINURIA CREATININE RATIO LESS THAN 30 MG/G: Primary | ICD-10-CM

## 2025-05-01 DIAGNOSIS — N18.6 TYPE 2 DIABETES MELLITUS WITH END-STAGE RENAL DISEASE: ICD-10-CM

## 2025-05-01 DIAGNOSIS — I12.9 PARENCHYMAL RENAL HYPERTENSION: ICD-10-CM

## 2025-05-01 DIAGNOSIS — E11.65 TYPE 2 DIABETES MELLITUS WITH HYPERGLYCEMIA, WITHOUT LONG-TERM CURRENT USE OF INSULIN: ICD-10-CM

## 2025-05-01 DIAGNOSIS — E83.52 HYPERCALCEMIA: ICD-10-CM

## 2025-05-01 DIAGNOSIS — E11.65 TYPE 2 DIABETES MELLITUS WITH HYPERGLYCEMIA, WITHOUT LONG-TERM CURRENT USE OF INSULIN: Primary | ICD-10-CM

## 2025-05-01 LAB
25(OH)D3+25(OH)D2 SERPL-MCNC: 55 NG/ML (ref 30–80)
ALBUMIN SERPL-MCNC: 3.8 G/DL (ref 3.4–4.8)
ALBUMIN/GLOB SERPL: 0.9 RATIO (ref 1.1–2)
ALP SERPL-CCNC: 64 UNIT/L (ref 40–150)
ALT SERPL-CCNC: 18 UNIT/L (ref 0–55)
ANION GAP SERPL CALC-SCNC: 8 MEQ/L
AST SERPL-CCNC: 16 UNIT/L (ref 11–45)
BASOPHILS # BLD AUTO: 0.04 X10(3)/MCL
BASOPHILS NFR BLD AUTO: 0.6 %
BILIRUB SERPL-MCNC: 0.6 MG/DL
BUN SERPL-MCNC: 21.6 MG/DL (ref 9.8–20.1)
CALCIUM SERPL-MCNC: 9.6 MG/DL (ref 8.4–10.2)
CHLORIDE SERPL-SCNC: 103 MMOL/L (ref 98–107)
CO2 SERPL-SCNC: 25 MMOL/L (ref 23–31)
CREAT SERPL-MCNC: 1.49 MG/DL (ref 0.55–1.02)
CREAT/UREA NIT SERPL: 14
EOSINOPHIL # BLD AUTO: 0.09 X10(3)/MCL (ref 0–0.9)
EOSINOPHIL NFR BLD AUTO: 1.3 %
ERYTHROCYTE [DISTWIDTH] IN BLOOD BY AUTOMATED COUNT: 12.6 % (ref 11.5–17)
EST. AVERAGE GLUCOSE BLD GHB EST-MCNC: 111.2 MG/DL
GFR SERPLBLD CREATININE-BSD FMLA CKD-EPI: 38 ML/MIN/1.73/M2
GLOBULIN SER-MCNC: 4.1 GM/DL (ref 2.4–3.5)
GLUCOSE SERPL-MCNC: 130 MG/DL (ref 82–115)
HBA1C MFR BLD: 5.5 %
HCT VFR BLD AUTO: 39.2 % (ref 37–47)
HGB BLD-MCNC: 12.7 G/DL (ref 12–16)
IMM GRANULOCYTES # BLD AUTO: 0.01 X10(3)/MCL (ref 0–0.04)
IMM GRANULOCYTES NFR BLD AUTO: 0.1 %
LYMPHOCYTES # BLD AUTO: 2.24 X10(3)/MCL (ref 0.6–4.6)
LYMPHOCYTES NFR BLD AUTO: 32 %
MAGNESIUM SERPL-MCNC: 2.1 MG/DL (ref 1.6–2.6)
MCH RBC QN AUTO: 31.5 PG (ref 27–31)
MCHC RBC AUTO-ENTMCNC: 32.4 G/DL (ref 33–36)
MCV RBC AUTO: 97.3 FL (ref 80–94)
MONOCYTES # BLD AUTO: 0.48 X10(3)/MCL (ref 0.1–1.3)
MONOCYTES NFR BLD AUTO: 6.8 %
NEUTROPHILS # BLD AUTO: 4.15 X10(3)/MCL (ref 2.1–9.2)
NEUTROPHILS NFR BLD AUTO: 59.2 %
NRBC BLD AUTO-RTO: 0 %
PHOSPHATE SERPL-MCNC: 3.2 MG/DL (ref 2.3–4.7)
PLATELET # BLD AUTO: 211 X10(3)/MCL (ref 130–400)
PMV BLD AUTO: 9.8 FL (ref 7.4–10.4)
POTASSIUM SERPL-SCNC: 4.2 MMOL/L (ref 3.5–5.1)
PROT SERPL-MCNC: 7.9 GM/DL (ref 5.8–7.6)
PTH-INTACT SERPL-MCNC: 95.9 PG/ML (ref 8.7–77)
RBC # BLD AUTO: 4.03 X10(6)/MCL (ref 4.2–5.4)
SODIUM SERPL-SCNC: 136 MMOL/L (ref 136–145)
WBC # BLD AUTO: 7.01 X10(3)/MCL (ref 4.5–11.5)

## 2025-05-01 PROCEDURE — 83970 ASSAY OF PARATHORMONE: CPT

## 2025-05-01 PROCEDURE — 36415 COLL VENOUS BLD VENIPUNCTURE: CPT

## 2025-05-01 PROCEDURE — 83036 HEMOGLOBIN GLYCOSYLATED A1C: CPT

## 2025-05-01 PROCEDURE — 80053 COMPREHEN METABOLIC PANEL: CPT

## 2025-05-01 PROCEDURE — 82306 VITAMIN D 25 HYDROXY: CPT

## 2025-05-01 PROCEDURE — 83735 ASSAY OF MAGNESIUM: CPT

## 2025-05-01 PROCEDURE — 84100 ASSAY OF PHOSPHORUS: CPT

## 2025-05-01 PROCEDURE — 85025 COMPLETE CBC W/AUTO DIFF WBC: CPT

## 2025-05-01 NOTE — PROGRESS NOTES
Please inform patient of results.    1. A1c is 5.5.- improved from previous. Good job.  Monitor cbgs

## 2025-05-13 ENCOUNTER — PATIENT MESSAGE (OUTPATIENT)
Dept: FAMILY MEDICINE | Facility: CLINIC | Age: 70
End: 2025-05-13
Payer: MEDICARE

## 2025-05-13 DIAGNOSIS — E11.65 TYPE 2 DIABETES MELLITUS WITH HYPERGLYCEMIA, WITHOUT LONG-TERM CURRENT USE OF INSULIN: ICD-10-CM

## 2025-06-19 NOTE — TELEPHONE ENCOUNTER
----- Message from Jerson Zuniga DO sent at 6/19/2025  2:05 PM CDT -----  Regarding: FW: Request for diabetes education referral  Okay to have referral  ----- Message -----  From: Sakshi Jin RD  Sent: 6/19/2025   1:15 PM CDT  To: Jerson Zuniga DO; #  Subject: Request for diabetes education referral          Would you please enter an updated referral for diabetes education for 2 hours. Silvio is requesting an education visit for blood glucose review and instruction on DASH diet for blood pressure control. Thank you!    Sakshi Jin RD,CD,[facility]   Patient ok with mounjaro. Please send to [facility] and [facility]. She's going to stop by tomorrow and  a sample box

## 2025-07-25 ENCOUNTER — LAB VISIT (OUTPATIENT)
Dept: LAB | Facility: HOSPITAL | Age: 70
End: 2025-07-25
Attending: FAMILY MEDICINE
Payer: MEDICARE

## 2025-07-25 DIAGNOSIS — E11.65 TYPE 2 DIABETES MELLITUS WITH HYPERGLYCEMIA, WITHOUT LONG-TERM CURRENT USE OF INSULIN: ICD-10-CM

## 2025-07-25 DIAGNOSIS — I15.2 HYPERTENSION ASSOCIATED WITH DIABETES: ICD-10-CM

## 2025-07-25 DIAGNOSIS — E11.69 HYPERLIPIDEMIA ASSOCIATED WITH TYPE 2 DIABETES MELLITUS: ICD-10-CM

## 2025-07-25 DIAGNOSIS — N18.32 STAGE 3B CHRONIC KIDNEY DISEASE: ICD-10-CM

## 2025-07-25 DIAGNOSIS — E55.9 VITAMIN D DEFICIENCY: ICD-10-CM

## 2025-07-25 DIAGNOSIS — M10.9 GOUT, UNSPECIFIED CAUSE, UNSPECIFIED CHRONICITY, UNSPECIFIED SITE: ICD-10-CM

## 2025-07-25 DIAGNOSIS — E11.59 HYPERTENSION ASSOCIATED WITH DIABETES: ICD-10-CM

## 2025-07-25 DIAGNOSIS — Z00.00 WELLNESS EXAMINATION: ICD-10-CM

## 2025-07-25 DIAGNOSIS — E78.5 HYPERLIPIDEMIA ASSOCIATED WITH TYPE 2 DIABETES MELLITUS: ICD-10-CM

## 2025-07-25 LAB
25(OH)D3+25(OH)D2 SERPL-MCNC: 50 NG/ML (ref 30–80)
ALBUMIN SERPL-MCNC: 3.9 G/DL (ref 3.4–4.8)
ALBUMIN/CREAT UR: 5.7 MG/GM CR (ref 0–30)
ALBUMIN/GLOB SERPL: 1.1 RATIO (ref 1.1–2)
ALP SERPL-CCNC: 68 UNIT/L (ref 40–150)
ALT SERPL-CCNC: 17 UNIT/L (ref 0–55)
ANION GAP SERPL CALC-SCNC: 10 MEQ/L
AST SERPL-CCNC: 16 UNIT/L (ref 11–45)
BASOPHILS # BLD AUTO: 0.05 X10(3)/MCL
BASOPHILS NFR BLD AUTO: 0.7 %
BILIRUB SERPL-MCNC: 0.5 MG/DL
BUN SERPL-MCNC: 23.8 MG/DL (ref 9.8–20.1)
CALCIUM SERPL-MCNC: 9.6 MG/DL (ref 8.4–10.2)
CHLORIDE SERPL-SCNC: 106 MMOL/L (ref 98–107)
CHOLEST SERPL-MCNC: 180 MG/DL
CHOLEST/HDLC SERPL: 4 {RATIO} (ref 0–5)
CO2 SERPL-SCNC: 23 MMOL/L (ref 23–31)
CREAT SERPL-MCNC: 1.54 MG/DL (ref 0.55–1.02)
CREAT UR-MCNC: 136.3 MG/DL (ref 45–106)
CREAT/UREA NIT SERPL: 15
EOSINOPHIL # BLD AUTO: 0.05 X10(3)/MCL (ref 0–0.9)
EOSINOPHIL NFR BLD AUTO: 0.7 %
ERYTHROCYTE [DISTWIDTH] IN BLOOD BY AUTOMATED COUNT: 12.7 % (ref 11.5–17)
EST. AVERAGE GLUCOSE BLD GHB EST-MCNC: 114 MG/DL
GFR SERPLBLD CREATININE-BSD FMLA CKD-EPI: 36 ML/MIN/1.73/M2
GLOBULIN SER-MCNC: 3.7 GM/DL (ref 2.4–3.5)
GLUCOSE SERPL-MCNC: 142 MG/DL (ref 82–115)
HBA1C MFR BLD: 5.6 %
HCT VFR BLD AUTO: 38.4 % (ref 37–47)
HDLC SERPL-MCNC: 48 MG/DL (ref 35–60)
HGB BLD-MCNC: 12.4 G/DL (ref 12–16)
IMM GRANULOCYTES # BLD AUTO: 0.01 X10(3)/MCL (ref 0–0.04)
IMM GRANULOCYTES NFR BLD AUTO: 0.1 %
LDLC SERPL CALC-MCNC: 115 MG/DL (ref 50–140)
LYMPHOCYTES # BLD AUTO: 1.86 X10(3)/MCL (ref 0.6–4.6)
LYMPHOCYTES NFR BLD AUTO: 26.2 %
MCH RBC QN AUTO: 30.8 PG (ref 27–31)
MCHC RBC AUTO-ENTMCNC: 32.3 G/DL (ref 33–36)
MCV RBC AUTO: 95.5 FL (ref 80–94)
MICROALBUMIN UR-MCNC: 7.8 UG/ML
MONOCYTES # BLD AUTO: 0.43 X10(3)/MCL (ref 0.1–1.3)
MONOCYTES NFR BLD AUTO: 6 %
NEUTROPHILS # BLD AUTO: 4.71 X10(3)/MCL (ref 2.1–9.2)
NEUTROPHILS NFR BLD AUTO: 66.3 %
NRBC BLD AUTO-RTO: 0 %
PLATELET # BLD AUTO: 224 X10(3)/MCL (ref 130–400)
PMV BLD AUTO: 9.9 FL (ref 7.4–10.4)
POTASSIUM SERPL-SCNC: 4.1 MMOL/L (ref 3.5–5.1)
PROT SERPL-MCNC: 7.6 GM/DL (ref 5.8–7.6)
RBC # BLD AUTO: 4.02 X10(6)/MCL (ref 4.2–5.4)
SODIUM SERPL-SCNC: 139 MMOL/L (ref 136–145)
TRIGL SERPL-MCNC: 84 MG/DL (ref 37–140)
TSH SERPL-ACNC: 1.35 UIU/ML (ref 0.35–4.94)
URATE SERPL-MCNC: 5.9 MG/DL (ref 2.6–6)
VLDLC SERPL CALC-MCNC: 17 MG/DL
WBC # BLD AUTO: 7.11 X10(3)/MCL (ref 4.5–11.5)

## 2025-07-25 PROCEDURE — 80053 COMPREHEN METABOLIC PANEL: CPT

## 2025-07-25 PROCEDURE — 84443 ASSAY THYROID STIM HORMONE: CPT

## 2025-07-25 PROCEDURE — 36415 COLL VENOUS BLD VENIPUNCTURE: CPT

## 2025-07-25 PROCEDURE — 83036 HEMOGLOBIN GLYCOSYLATED A1C: CPT

## 2025-07-25 PROCEDURE — 82306 VITAMIN D 25 HYDROXY: CPT

## 2025-07-25 PROCEDURE — 84550 ASSAY OF BLOOD/URIC ACID: CPT

## 2025-07-25 PROCEDURE — 82570 ASSAY OF URINE CREATININE: CPT

## 2025-07-25 PROCEDURE — 80061 LIPID PANEL: CPT

## 2025-07-25 PROCEDURE — 85025 COMPLETE CBC W/AUTO DIFF WBC: CPT

## 2025-07-29 ENCOUNTER — OFFICE VISIT (OUTPATIENT)
Dept: FAMILY MEDICINE | Facility: CLINIC | Age: 70
End: 2025-07-29
Payer: MEDICARE

## 2025-07-29 VITALS
WEIGHT: 210.63 LBS | DIASTOLIC BLOOD PRESSURE: 78 MMHG | OXYGEN SATURATION: 98 % | RESPIRATION RATE: 16 BRPM | HEART RATE: 66 BPM | HEIGHT: 64 IN | SYSTOLIC BLOOD PRESSURE: 132 MMHG | BODY MASS INDEX: 35.96 KG/M2 | TEMPERATURE: 98 F

## 2025-07-29 DIAGNOSIS — R71.8 ELEVATED MCV: ICD-10-CM

## 2025-07-29 DIAGNOSIS — Z71.89 ADVANCED CARE PLANNING/COUNSELING DISCUSSION: ICD-10-CM

## 2025-07-29 DIAGNOSIS — I15.2 HYPERTENSION ASSOCIATED WITH DIABETES: ICD-10-CM

## 2025-07-29 DIAGNOSIS — E11.59 HYPERTENSION ASSOCIATED WITH DIABETES: ICD-10-CM

## 2025-07-29 DIAGNOSIS — Z78.0 POSTMENOPAUSAL: ICD-10-CM

## 2025-07-29 DIAGNOSIS — E11.65 TYPE 2 DIABETES MELLITUS WITH HYPERGLYCEMIA, WITHOUT LONG-TERM CURRENT USE OF INSULIN: ICD-10-CM

## 2025-07-29 DIAGNOSIS — E78.5 HYPERLIPIDEMIA ASSOCIATED WITH TYPE 2 DIABETES MELLITUS: ICD-10-CM

## 2025-07-29 DIAGNOSIS — Z12.31 BREAST CANCER SCREENING BY MAMMOGRAM: ICD-10-CM

## 2025-07-29 DIAGNOSIS — N18.32 STAGE 3B CHRONIC KIDNEY DISEASE: ICD-10-CM

## 2025-07-29 DIAGNOSIS — Z79.890 POSTMENOPAUSAL HORMONE THERAPY: ICD-10-CM

## 2025-07-29 DIAGNOSIS — Z00.00 MEDICARE ANNUAL WELLNESS VISIT, SUBSEQUENT: Primary | ICD-10-CM

## 2025-07-29 DIAGNOSIS — Z12.11 COLON CANCER SCREENING: ICD-10-CM

## 2025-07-29 DIAGNOSIS — E11.69 HYPERLIPIDEMIA ASSOCIATED WITH TYPE 2 DIABETES MELLITUS: ICD-10-CM

## 2025-07-29 DIAGNOSIS — E55.9 VITAMIN D DEFICIENCY: ICD-10-CM

## 2025-07-29 DIAGNOSIS — E66.01 SEVERE OBESITY (BMI 35.0-39.9) WITH COMORBIDITY: ICD-10-CM

## 2025-07-29 PROCEDURE — G0439 PPPS, SUBSEQ VISIT: HCPCS | Mod: ,,, | Performed by: FAMILY MEDICINE

## 2025-07-29 PROCEDURE — 99497 ADVNCD CARE PLAN 30 MIN: CPT | Mod: 33,,, | Performed by: FAMILY MEDICINE

## 2025-07-29 NOTE — ASSESSMENT & PLAN NOTE
Lab Results   Component Value Date    HGBA1C 5.6 07/25/2025     Urine micro 7/2025  Foot exam 1/2025  Eye exam with dr. Murrieta. Will request      On statin and arb. On mounjaro 7.5mg and farxiga

## 2025-07-29 NOTE — ASSESSMENT & PLAN NOTE
dexa 8/2022 and 8/2024 showed osteopenia. On calcium and vit d. Encouraged weight bearing exercises.

## 2025-07-29 NOTE — ASSESSMENT & PLAN NOTE
"On statin, niacin and fish oil. Did not tolerate zetia    Lab Results   Component Value Date    CHOL 180 07/25/2025    CHOL 209 (H) 07/24/2024    CHOL 221 (H) 07/24/2023     Lab Results   Component Value Date    HDL 48 07/25/2025    HDL 42 07/24/2024    HDL 49 07/24/2023     Lab Results   Component Value Date    LDLCALC 115.00 07/25/2025    LDLCALC 135.00 07/24/2024    LDLCALC 153.00 (H) 07/24/2023     No results found for: "DLDL"  Lab Results   Component Value Date    TRIG 84 07/25/2025    TRIG 162 (H) 07/24/2024    TRIG 94 07/24/2023       f1 No results found for: "CHOLHDL"    "

## 2025-07-29 NOTE — PROGRESS NOTES
Internal Medicine      Patient ID: [id number]     Chief Complaint: Medicare Annual Wellness     HPI:     Shakila Velazquez is a 69 y.o. female here today for a Medicare Annual Wellness visit and comprehensive Health Risk Assessment.     Very pleasant patient presents to clinic unaccompanied for her wellness visit. She did labs prior to her appt and it was reviewed with her at time of appt today.       Has CKD3 .  seeing renal- dr. multani.  Drinking more water.  Saw him 5/2025. Has appt 12/2025.   started on oral iron 2x/week.       she has htn and hld and is on norvasc and losartan. on asa. zetia did not work. started niacin mid 8/2021. Tolerating pravastatin 20mg daily.      she has diabetes. she is on mounjaro 7.5mg weekly and farxiga.   last a1c was 5.8 7/2024. Foot exam 1/2025. eye exams with dr. Betts 1/2025. Will request.  last urine micro 7/2024.     Her mobility form completed was completed 4/2019 due to OA of knees.  She has appt with dr. Tse 7/2025. No changes.  Did labs.      She is obese. Gained weight since lov.  Weight on vacation to [place].  has been on adipex in the past but not currently.     She had a mammogram in 8/2024.  dexa done 8/2022 and 8/2024 showed osteopenia. Had colonoscopy with Dr. Cadet 9/2022. had a complete hysterectomy for noncancerous reasons and therefore no longer does pap smears.  She is on bi:est compounded cream.      She is ALLERGIC to sulfa drugs. She drinks alcohol rarely. She does not smoke.  completed covid series and booster. utd on pneumonia shots. completed shingrix series.                    Health Maintenance         Date Due Completion Date    Mammogram 08/22/2025 8/22/2024    RSV Vaccine (Age 60+ and Pregnant patients) (1 - Risk 60-74 years 1-dose series) 07/29/2025 (Originally 8/25/2014) ---    TETANUS VACCINE 01/28/2026 (Originally 8/1/2024) 8/1/2014    Influenza Vaccine (1) 09/01/2025 9/9/2024    Diabetic Eye Exam 12/11/2025 12/11/2024    Hemoglobin A1c  01/25/2026 7/25/2025    Foot Exam 01/28/2026 1/28/2025    Diabetes Urine Screening 07/25/2026 7/25/2025    Lipid Panel 07/25/2026 7/25/2025    DEXA Scan 08/22/2029 8/22/2024    Colorectal Cancer Screening 09/21/2032 9/21/2022             Past Medical History:   Diagnosis Date    Gout 07/21/2022    Hyperlipidemia associated with type 2 diabetes mellitus 09/03/2021    Hypertension associated with diabetes 01/19/2023    Osteoarthritis of knee 07/21/2022    Severe obesity (BMI 35.0-39.9) with comorbidity     Stage 3b chronic kidney disease 09/03/2021    Last Assessment & Plan:   Formatting of this note might be different from the original.  Renal indices remain stable at baseline.    Type 2 diabetes mellitus 07/21/2022    Vitamin D deficiency 07/21/2022        Past Surgical History:   Procedure Laterality Date    HYSTERECTOMY  1986    TUBAL LIGATION  1985        Social History     Socioeconomic History    Marital status:    Tobacco Use    Smoking status: Never    Smokeless tobacco: Never   Substance and Sexual Activity    Alcohol use: Never    Drug use: Never    Sexual activity: Yes     Partners: Female     Birth control/protection: None     Social Drivers of Health     Financial Resource Strain: Low Risk  (1/10/2024)    Overall Financial Resource Strain (CARDIA)     Difficulty of Paying Living Expenses: Not hard at all   Food Insecurity: No Food Insecurity (1/10/2024)    Hunger Vital Sign     Worried About Running Out of Food in the Last Year: Never true     Ran Out of Food in the Last Year: Never true   Transportation Needs: No Transportation Needs (1/10/2024)    PRAPARE - Transportation     Lack of Transportation (Medical): No     Lack of Transportation (Non-Medical): No   Physical Activity: Sufficiently Active (1/10/2024)    Exercise Vital Sign     Days of Exercise per Week: 5 days     Minutes of Exercise per Session: 70 min   Stress: No Stress Concern Present (1/10/2024)    [employer] - Occupational Stress Questionnaire     Feeling of Stress : Not at all   Housing Stability: Low Risk  (1/10/2024)    Housing Stability Vital Sign     Unable to Pay for Housing in the Last Year: No     Number of Places Lived in the Last Year: 1     Unstable Housing in the Last Year: No        Family History   Problem Relation Name Age of Onset    Cancer Mother Carol Swain     Cancer Sister Keri Swain     Cancer Daughter Prateek Hong     Diabetes Paternal Aunt Saloni Swain     Cancer Maternal Grandmother Dania Wilturner     Cancer Maternal Grandfather Arjun Wilturner     Cancer Paternal Grandmother [place] Swain     Cancer Paternal Grandfather Timmy Swain     Diabetes Paternal Aunt Saloni Swain         Current Outpatient Medications   Medication Instructions    amLODIPine (NORVASC) 10 mg, Oral, Daily    b complex vitamins capsule 1 capsule, Oral, Daily    FARXIGA 10 mg, Oral, Daily    ferrous sulfate (FEOSOL) 325 mg, Oral    losartan (COZAAR) 50 mg, Oral, Daily    niacin 500 mg TbSR No dose, route, or frequency recorded.    nystatin-triamcinolone (MYCOLOG II) cream Topical (Top), 4 times daily    omega-3s/dha/epa/fish oil/D3 (VITAMIN-D + OMEGA-3 ORAL) Oral    pravastatin (PRAVACHOL) 40 mg, Oral, Nightly    tirzepatide 7.5 mg, Subcutaneous, Every 7 days    UNABLE TO FIND BIEST 1.75MG/ML CREAM(LAF)  APPLY 0.5-MLS(1/2 SYRINGEFUL) TOPICALLY TO INNER ARMS/INNER THIGHS EVERY NIGHT AT BEDTIME AND MASSAGE IN...ROTATE SITES each time.    Please dispense 90 day supply    UNABLE TO FIND BIEST 1.75MG/ML CREAM(LAF)    UNABLE TO FIND MOUNJARO 7.5 MG/0.5ML       Review of patient's allergies indicates:   Allergen Reactions    Sulfa (sulfonamide antibiotics) Hives and Itching        Immunization History   Administered Date(s) Administered    COVID-19 MRNA, LN-S PF (MODERNA HALF 0.25 ML DOSE) 10/27/2021, 09/26/2023    COVID-19 Vaccine 02/22/2021, 03/22/2021, 10/27/2021, 04/20/2022, 04/20/2022     "COVID-19, MRNA, LN-S, PF (MODERNA FULL 0.5 ML DOSE) 02/22/2021, 03/22/2021, 04/20/2022, 10/19/2022    COVID-19, MRNA, LN-S, PF (Pfizer) (Purple Cap) 10/19/2022    COVID-19, mRNA, LNP-S, PF (Moderna) Ages 12+ 09/09/2024    COVID-19, mRNA, LNP-S, bivalent booster, PF (Moderna Omicron)12 + YEARS 10/19/2022    COVID-19, mRNA, LNP-S, bivalent booster, PF (PFIZER OMICRON YEARS 5-11) 10/19/2022    Influenza 09/16/2020, 10/12/2021    Influenza (FLUAD) - Quadrivalent - Adjuvanted - PF *Preferred* (65+) 09/28/2022    Influenza - Quadrivalent - High Dose - PF (65 years and older) 09/16/2023    Influenza - Trivalent - Fluarix, Flulaval, Fluzone, Afluria - PF 09/16/2020, 10/12/2021    Influenza - Trivalent - Fluzone High Dose - PF (65 years and older) 09/09/2024    Pneumococcal Conjugate - 13 Valent 06/28/2020    Pneumococcal Polysaccharide - 23 Valent 06/28/2021, 06/28/2021    Tdap 08/01/2014    Zoster 01/12/2020, 03/29/2020    Zoster Recombinant 01/12/2020, 03/29/2020        Patient Care Team:  Regi Arizmendi MD as PCP - General (Family Medicine)  Maurice Cadet MD as Consulting Physician (Gastroenterology)  Denita Tse MD as Consulting Physician (Rheumatology)  Oral De MD as Consulting Physician (Nephrology)  Richmond Betts MD as Consulting Physician (Ophthalmology)    Subjective:     Review of Systems    12 point review of systems conducted, negative except as stated in the history of present illness. See HPI for details.    Objective:     Visit Vitals  /78 (BP Location: Left arm, Patient Position: Sitting)   Pulse 66   Temp 98.3 °F (36.8 °C) (Oral)   Resp 16   Ht 5' 4" (1.626 m)   Wt 95.5 kg (210 lb 9.6 oz)   LMP  (LMP Unknown)   SpO2 98%   BMI 36.15 kg/m²       Physical Exam    Assessment:       ICD-10-CM ICD-9-CM   1. Medicare annual wellness visit, subsequent  Z00.00 V70.0   2. Advanced care planning/counseling discussion  Z71.89 V65.49   3. Type 2 diabetes mellitus with hyperglycemia, " without long-term current use of insulin  E11.65 250.00     790.29   4. Hypertension associated with diabetes  E11.59 250.80    I15.2 401.9   5. Hyperlipidemia associated with type 2 diabetes mellitus  E11.69 250.80    E78.5 272.4   6. Stage 3b chronic kidney disease  N18.32 585.3   7. Vitamin D deficiency  E55.9 268.9   8. Postmenopausal  Z78.0 V49.81   9. Postmenopausal hormone therapy  Z79.890 V07.4   10. Breast cancer screening by mammogram  Z12.31 V76.12   11. Colon cancer screening  Z12.11 V76.51   12. Severe obesity (BMI 35.0-39.9) with comorbidity  E66.01 278.01   13. Elevated MCV  R71.8 790.09        Plan:     1. Medicare annual wellness visit, subsequent  Assessment & Plan:  Previously done labs reviewed at time of visit today  mmg 8/2024. Ordered  dexa 8/2022 and 8/2024 showed osteopenia.   Colonoscopy 9/2022 with dr. bhandari   No longer does pap smear due to complete hyst for noncancerous reasons    Advanced care planning discussed and paperwork given.      2. Advanced care planning/counseling discussion  Assessment & Plan:  Advanced care planning discussed and paperwork given.    I attest that I have had a face to face discussion with patient and or surrogate decision maker.   Included surrogate decision maker: NO  Advanced directive in chart: NO  LAPOST: NO    Total time spent: 16 minutes        3. Type 2 diabetes mellitus with hyperglycemia, without long-term current use of insulin  Assessment & Plan:  Lab Results   Component Value Date    HGBA1C 5.6 07/25/2025     Urine micro 7/2025  Foot exam 1/2025  Eye exam with dr. Murrieta. Will request      On statin and arb. On mounjaro 7.5mg and farxiga    Orders:  -     CBC Auto Differential; Future; Expected date: 01/29/2026  -     Comprehensive Metabolic Panel; Future; Expected date: 01/29/2026  -     Hemoglobin A1C; Future; Expected date: 01/29/2026  -     Folate; Future; Expected date: 07/29/2025  -     Vitamin B12; Future; Expected date: 07/29/2025  -     Iron  "and TIBC; Future; Expected date: 07/29/2025  -     Ferritin; Future; Expected date: 07/29/2025  -     Diabetes Digital Medicine (DDMP) Enrollment Order    4. Hypertension associated with diabetes  Assessment & Plan:  Well controlled on current prescriptions.   Continue to monitor bp. Contact clinic for concerns. Patient is agreeable to plan and verbalized understanding    Orders:  -     CBC Auto Differential; Future; Expected date: 01/29/2026  -     Comprehensive Metabolic Panel; Future; Expected date: 01/29/2026  -     Hemoglobin A1C; Future; Expected date: 01/29/2026  -     Folate; Future; Expected date: 07/29/2025  -     Vitamin B12; Future; Expected date: 07/29/2025  -     Iron and TIBC; Future; Expected date: 07/29/2025  -     Ferritin; Future; Expected date: 07/29/2025  -     Hypertension Digital Medicine (HDMP) Enrollment Order    5. Hyperlipidemia associated with type 2 diabetes mellitus  Assessment & Plan:  On statin, niacin and fish oil. Did not tolerate zetia    Lab Results   Component Value Date    CHOL 180 07/25/2025    CHOL 209 (H) 07/24/2024    CHOL 221 (H) 07/24/2023     Lab Results   Component Value Date    HDL 48 07/25/2025    HDL 42 07/24/2024    HDL 49 07/24/2023     Lab Results   Component Value Date    LDLCALC 115.00 07/25/2025    LDLCALC 135.00 07/24/2024    LDLCALC 153.00 (H) 07/24/2023     No results found for: "DLDL"  Lab Results   Component Value Date    TRIG 84 07/25/2025    TRIG 162 (H) 07/24/2024    TRIG 94 07/24/2023       f1 No results found for: "CHOLHDL"        6. Stage 3b chronic kidney disease  Overview:  Last Assessment & Plan:   Formatting of this note might be different from the original.  Renal indices remain stable at baseline.    Assessment & Plan:  Keep scheduled appointments with renal. Encourage fluids. On oral iron    Orders:  -     CBC Auto Differential; Future; Expected date: 01/29/2026  -     Comprehensive Metabolic Panel; Future; Expected date: 01/29/2026  -     " Hemoglobin A1C; Future; Expected date: 01/29/2026  -     Folate; Future; Expected date: 07/29/2025  -     Vitamin B12; Future; Expected date: 07/29/2025  -     Iron and TIBC; Future; Expected date: 07/29/2025  -     Ferritin; Future; Expected date: 07/29/2025    7. Vitamin D deficiency  Assessment & Plan:  Cont to supplement otc. Level 7/2025 wnl      8. Postmenopausal  Assessment & Plan:  dexa 8/2022 and 8/2024 showed osteopenia. On calcium and vit d. Encouraged weight bearing exercises.       9. Postmenopausal hormone therapy  Assessment & Plan:  On bi est compounded rx.        10. Breast cancer screening by mammogram  Assessment & Plan:  mmg 8/2024. ordered    Orders:  -     Mammo Digital Screening Bilat w/ Reymundo (XPD); Future; Expected date: 07/29/2025    11. Colon cancer screening  Assessment & Plan:  Colonoscopy with dr. bhandari 9/2022      12. Severe obesity (BMI 35.0-39.9) with comorbidity  Assessment & Plan:  Encouraged lifestyle change      13. Elevated MCV  -     CBC Auto Differential; Future; Expected date: 01/29/2026  -     Comprehensive Metabolic Panel; Future; Expected date: 01/29/2026  -     Hemoglobin A1C; Future; Expected date: 01/29/2026  -     Folate; Future; Expected date: 07/29/2025  -     Vitamin B12; Future; Expected date: 07/29/2025  -     Iron and TIBC; Future; Expected date: 07/29/2025  -     Ferritin; Future; Expected date: 07/29/2025         A comprehensive HEALTH RISK ASSESSMENT was completed today. Results are summarized below:    There are NO EMOTIONAL/SOCIAL CONCERNS identified on today's screening for Social Isolation, Depression and Anxiety.    There are NO COGNITIVE FUNCTION CONCERNS identified on today's screening.  There are NO FUNCTIONAL OR SAFETY CONCERNS were identified on today's screening for Physical Symptoms, Nutritional, Cognitive Function, Home Safety/Living Situation, Fall Risk, Activities of Daily Living, Independent Activities of Daily Living, Physical Activity, Timed Up  and Go test and Whisper test.   The patient reports NO OPIOID PRESCRIPTIONS. This was confirmed through medication reconciliation.    The patient is NOT A TOBACCO USER.        All Questions regarding food, transportation or housing were not answered today.    The patient was asked and declined the use of a free .    Advance Care Planning   Today we discussed advance care planning. She is interested in learning more about how to make Advance Directives. Information was provided and I offered to discuss more at her discretion.         Provided patient with a 5-10 year written screening schedule and personal prevention plan. Recommendations were developed using the USPSTF age appropriate recommendations. Education, counseling, and referrals were provided as needed. After Visit Summary printed and given to patient, which includes a list of additional screenings\tests needed.    Follow up in about 6 months (around 1/29/2026) for diabetes with labs, diabetes foot exam. In addition to their scheduled follow up, the patient has also been instructed to follow up on as needed basis.     No future appointments.       Regi Arizmendi MD

## 2025-07-29 NOTE — ASSESSMENT & PLAN NOTE
Previously done labs reviewed at time of visit today  mmg 8/2024. Ordered  dexa 8/2022 and 8/2024 showed osteopenia.   Colonoscopy 9/2022 with dr. bhandari   No longer does pap smear due to complete hyst for noncancerous reasons    Advanced care planning discussed and paperwork given.

## 2025-08-27 ENCOUNTER — HOSPITAL ENCOUNTER (OUTPATIENT)
Dept: RADIOLOGY | Facility: HOSPITAL | Age: 70
Discharge: HOME | End: 2025-08-27
Attending: FAMILY MEDICINE
Payer: MEDICARE

## 2025-08-27 DIAGNOSIS — Z12.31 BREAST CANCER SCREENING BY MAMMOGRAM: ICD-10-CM

## 2025-08-27 PROCEDURE — 77063 BREAST TOMOSYNTHESIS BI: CPT | Mod: 26,,, | Performed by: RADIOLOGY

## 2025-08-27 PROCEDURE — 77067 SCR MAMMO BI INCL CAD: CPT | Mod: 26,,, | Performed by: RADIOLOGY

## 2025-08-27 PROCEDURE — 77063 BREAST TOMOSYNTHESIS BI: CPT | Mod: TC

## 2025-08-30 ENCOUNTER — RESULTS FOLLOW-UP (OUTPATIENT)
Dept: FAMILY MEDICINE | Facility: CLINIC | Age: 70
End: 2025-08-30
Payer: MEDICARE

## 2025-09-02 ENCOUNTER — TELEPHONE (OUTPATIENT)
Dept: FAMILY MEDICINE | Facility: CLINIC | Age: 70
End: 2025-09-02
Payer: MEDICARE